# Patient Record
Sex: MALE | Race: OTHER | HISPANIC OR LATINO | URBAN - METROPOLITAN AREA
[De-identification: names, ages, dates, MRNs, and addresses within clinical notes are randomized per-mention and may not be internally consistent; named-entity substitution may affect disease eponyms.]

---

## 2017-08-01 ENCOUNTER — EMERGENCY (EMERGENCY)
Facility: HOSPITAL | Age: 67
LOS: 1 days | Discharge: HOME | End: 2017-08-01
Attending: EMERGENCY MEDICINE | Admitting: EMERGENCY MEDICINE
Payer: MEDICARE

## 2017-08-01 VITALS
RESPIRATION RATE: 19 BRPM | DIASTOLIC BLOOD PRESSURE: 92 MMHG | OXYGEN SATURATION: 100 % | SYSTOLIC BLOOD PRESSURE: 126 MMHG | TEMPERATURE: 100 F | HEIGHT: 63 IN | HEART RATE: 97 BPM | WEIGHT: 192.02 LBS

## 2017-08-01 VITALS
SYSTOLIC BLOOD PRESSURE: 161 MMHG | OXYGEN SATURATION: 96 % | RESPIRATION RATE: 16 BRPM | HEART RATE: 64 BPM | DIASTOLIC BLOOD PRESSURE: 96 MMHG
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
## 2017-08-01 DIAGNOSIS — S49.91XA UNSPECIFIED INJURY OF RIGHT SHOULDER AND UPPER ARM, INITIAL ENCOUNTER: ICD-10-CM

## 2017-08-01 DIAGNOSIS — I10 ESSENTIAL (PRIMARY) HYPERTENSION: ICD-10-CM

## 2017-08-01 DIAGNOSIS — S42.141A DISPLACED FRACTURE OF GLENOID CAVITY OF SCAPULA, RIGHT SHOULDER, INITIAL ENCOUNTER FOR CLOSED FRACTURE: ICD-10-CM
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
## 2017-08-01 DIAGNOSIS — E11.9 TYPE 2 DIABETES MELLITUS WITHOUT COMPLICATIONS: ICD-10-CM

## 2017-08-01 PROCEDURE — 99284 EMERGENCY DEPT VISIT MOD MDM: CPT | Mod: 25

## 2017-08-01 PROCEDURE — 99283 EMERGENCY DEPT VISIT LOW MDM: CPT | Mod: 25

## 2017-08-01 PROCEDURE — 23570 CLTX SCAPULAR FX W/O MNPJ: CPT | Mod: RT

## 2017-08-01 PROCEDURE — 73030 X-RAY EXAM OF SHOULDER: CPT

## 2017-08-01 PROCEDURE — 73030 X-RAY EXAM OF SHOULDER: CPT | Mod: 26,RT

## 2017-08-01 RX ORDER — OXYCODONE HYDROCHLORIDE 5 MG/1
1 TABLET ORAL
Qty: 16 | Refills: 0 | OUTPATIENT
Start: 2017-08-01 | End: 2017-08-05

## 2017-08-01 RX ORDER — OXYCODONE AND ACETAMINOPHEN 5; 325 MG/1; MG/1
1 TABLET ORAL ONCE
Qty: 0 | Refills: 0 | Status: DISCONTINUED | OUTPATIENT
Start: 2017-08-01 | End: 2017-08-01

## 2017-08-01 RX ADMIN — OXYCODONE AND ACETAMINOPHEN 1 TABLET(S): 5; 325 TABLET ORAL at 08:04

## 2017-08-01 NOTE — ED ADULT TRIAGE NOTE - CHIEF COMPLAINT QUOTE
pt  stated that he fell on his right side  while reaching for something at home and is now having r shoulder pain

## 2017-08-01 NOTE — ED PROVIDER NOTE - PRINCIPAL DIAGNOSIS
Shoulder injury, right, initial encounter Glenoid fracture of shoulder, right, closed, initial encounter

## 2017-08-01 NOTE — ED ADULT NURSE REASSESSMENT NOTE - NS ED NURSE REASSESS COMMENT FT1
pt in no acute distress. safety maintained. family at bedside. pending radiology results. will continue to monitor.

## 2017-08-01 NOTE — ED PROVIDER NOTE - MEDICAL DECISION MAKING DETAILS
Pt with injury to R shoulder after fall with limited ROM as noted above.  NVI intact, no other signs of injury.  DDx includes rotator cuff injury, dislocation.  Plan xray, pain control and reassess.

## 2017-08-01 NOTE — ED PROVIDER NOTE - OBJECTIVE STATEMENT
66 yo M diabetic with hx of HTN presenting with R shoulder pain s/p mechanical slip and fall from 2 foot height landing on outstretched R arm and hand causing immediate pain to R upper arm and shoulder.  DROM since injury which occurred yesterday at 3pm.  No other injury or trauma.  Denies head injury.

## 2017-08-01 NOTE — ED PROVIDER NOTE - DIAGNOSTIC INTERPRETATION
Shoulder xray:  Fracture of glenoid and likely coracoid process.  Nl humeral head.  No dislocation.  Nl soft tissue.

## 2017-08-01 NOTE — ED SUB INTERN NOTE - MEDICAL DECISION MAKING DETAILS
67 year old man with right shoulder pain and limited external rotation and abduction 2/2 fall from bed height with arm extended. DDx shoulder dislocation vs rotator cuff injury, humeral fracture less likely.   Pain control with oral percocet; A/P, oblique, axillary and y scapular views of right shoulder to evaluate for dislocation

## 2017-08-01 NOTE — ED ADULT NURSE NOTE - OBJECTIVE STATEMENT
right shoulder pain, from a fall yesterday, while reaching a cabinet right shoulder pain, from a fall yesterday, while reaching at a cabinet

## 2017-08-01 NOTE — ED PROVIDER NOTE - CARE PLAN
Principal Discharge DX:	Shoulder injury, right, initial encounter Principal Discharge DX:	Glenoid fracture of shoulder, right, closed, initial encounter

## 2017-08-01 NOTE — ED ADULT NURSE NOTE - CHPI ED SYMPTOMS NEG
no fever/no confusion/no tingling/no numbness/no weakness/no abrasion/no loss of consciousness/no deformity/no bleeding/no vomiting

## 2017-08-01 NOTE — ED SUB INTERN NOTE - OBJECTIVE STATEMENT FT
67 year old man, Yoruba speaking, HTN, DM2, presents with 1 day history of right shoulder pain. Was lifting object while in bed yesterday when he fell on the ground with right arm extended. Currently experiencing 10/10 pain worst over the anterior right humerus but including the entire right shoulder area. Also experiencing limited abduction and external rotation of shoulder.

## 2017-08-01 NOTE — ED PROVIDER NOTE - NEUROLOGICAL, MLM
Nl motor and sensory exam to MRU and axillary nerves.  Alert and oriented, no focal deficits, no motor or sensory deficits.

## 2017-08-01 NOTE — ED PROVIDER NOTE - MUSCULOSKELETAL, MLM
All bony prominences palpated and nontender except R proximal humerus and GH joint.  DROM R GH joint.  Abduction to 90 degrees only.  No obvious deformity.

## 2018-06-09 ENCOUNTER — INPATIENT (INPATIENT)
Facility: HOSPITAL | Age: 68
LOS: 3 days | Discharge: HOME | DRG: 69 | End: 2018-06-13
Attending: PSYCHIATRY & NEUROLOGY | Admitting: PSYCHIATRY & NEUROLOGY
Payer: MEDICARE

## 2018-06-09 VITALS
WEIGHT: 197.09 LBS | RESPIRATION RATE: 20 BRPM | SYSTOLIC BLOOD PRESSURE: 223 MMHG | TEMPERATURE: 98 F | OXYGEN SATURATION: 98 % | HEIGHT: 63 IN | HEART RATE: 60 BPM | DIASTOLIC BLOOD PRESSURE: 94 MMHG

## 2018-06-09 DIAGNOSIS — R63.8 OTHER SYMPTOMS AND SIGNS CONCERNING FOOD AND FLUID INTAKE: ICD-10-CM

## 2018-06-09 DIAGNOSIS — Z29.9 ENCOUNTER FOR PROPHYLACTIC MEASURES, UNSPECIFIED: ICD-10-CM

## 2018-06-09 DIAGNOSIS — I10 ESSENTIAL (PRIMARY) HYPERTENSION: ICD-10-CM

## 2018-06-09 DIAGNOSIS — E11.9 TYPE 2 DIABETES MELLITUS WITHOUT COMPLICATIONS: ICD-10-CM

## 2018-06-09 LAB
ALBUMIN SERPL ELPH-MCNC: 4.3 G/DL — SIGNIFICANT CHANGE UP (ref 3.3–5)
ALP SERPL-CCNC: 57 U/L — SIGNIFICANT CHANGE UP (ref 40–120)
ALT FLD-CCNC: 22 U/L — SIGNIFICANT CHANGE UP (ref 10–45)
ANION GAP SERPL CALC-SCNC: 11 MMOL/L — SIGNIFICANT CHANGE UP (ref 5–17)
APTT BLD: 30.5 SEC — SIGNIFICANT CHANGE UP (ref 27.5–37.4)
AST SERPL-CCNC: 23 U/L — SIGNIFICANT CHANGE UP (ref 10–40)
BASOPHILS NFR BLD AUTO: 0.4 % — SIGNIFICANT CHANGE UP (ref 0–2)
BILIRUB SERPL-MCNC: 0.9 MG/DL — SIGNIFICANT CHANGE UP (ref 0.2–1.2)
BUN SERPL-MCNC: 5 MG/DL — LOW (ref 7–23)
CALCIUM SERPL-MCNC: 9.3 MG/DL — SIGNIFICANT CHANGE UP (ref 8.4–10.5)
CHLORIDE SERPL-SCNC: 102 MMOL/L — SIGNIFICANT CHANGE UP (ref 96–108)
CHOLEST SERPL-MCNC: 157 MG/DL — SIGNIFICANT CHANGE UP (ref 10–199)
CO2 SERPL-SCNC: 26 MMOL/L — SIGNIFICANT CHANGE UP (ref 22–31)
CREAT SERPL-MCNC: 0.71 MG/DL — SIGNIFICANT CHANGE UP (ref 0.5–1.3)
EOSINOPHIL NFR BLD AUTO: 2.5 % — SIGNIFICANT CHANGE UP (ref 0–6)
GLUCOSE BLDC GLUCOMTR-MCNC: 117 MG/DL — HIGH (ref 70–99)
GLUCOSE BLDC GLUCOMTR-MCNC: 134 MG/DL — HIGH (ref 70–99)
GLUCOSE SERPL-MCNC: 132 MG/DL — HIGH (ref 70–99)
HCT VFR BLD CALC: 44 % — SIGNIFICANT CHANGE UP (ref 39–50)
HDLC SERPL-MCNC: 59 MG/DL — SIGNIFICANT CHANGE UP (ref 40–125)
HGB BLD-MCNC: 15.8 G/DL — SIGNIFICANT CHANGE UP (ref 13–17)
INR BLD: 1.01 — SIGNIFICANT CHANGE UP (ref 0.88–1.16)
LIPID PNL WITH DIRECT LDL SERPL: 82 MG/DL — SIGNIFICANT CHANGE UP
LYMPHOCYTES # BLD AUTO: 32.8 % — SIGNIFICANT CHANGE UP (ref 13–44)
MCHC RBC-ENTMCNC: 31.5 PG — SIGNIFICANT CHANGE UP (ref 27–34)
MCHC RBC-ENTMCNC: 35.9 G/DL — SIGNIFICANT CHANGE UP (ref 32–36)
MCV RBC AUTO: 87.6 FL — SIGNIFICANT CHANGE UP (ref 80–100)
MONOCYTES NFR BLD AUTO: 7.3 % — SIGNIFICANT CHANGE UP (ref 2–14)
NEUTROPHILS NFR BLD AUTO: 57 % — SIGNIFICANT CHANGE UP (ref 43–77)
PLATELET # BLD AUTO: 248 K/UL — SIGNIFICANT CHANGE UP (ref 150–400)
POTASSIUM SERPL-MCNC: 4 MMOL/L — SIGNIFICANT CHANGE UP (ref 3.5–5.3)
POTASSIUM SERPL-SCNC: 4 MMOL/L — SIGNIFICANT CHANGE UP (ref 3.5–5.3)
PROT SERPL-MCNC: 8.2 G/DL — SIGNIFICANT CHANGE UP (ref 6–8.3)
PROTHROM AB SERPL-ACNC: 11.2 SEC — SIGNIFICANT CHANGE UP (ref 9.8–12.7)
RBC # BLD: 5.02 M/UL — SIGNIFICANT CHANGE UP (ref 4.2–5.8)
RBC # FLD: 13 % — SIGNIFICANT CHANGE UP (ref 10.3–16.9)
SODIUM SERPL-SCNC: 139 MMOL/L — SIGNIFICANT CHANGE UP (ref 135–145)
TOTAL CHOLESTEROL/HDL RATIO MEASUREMENT: 2.7 RATIO — LOW (ref 3.4–9.6)
TRIGL SERPL-MCNC: 78 MG/DL — SIGNIFICANT CHANGE UP (ref 10–149)
WBC # BLD: 7.7 K/UL — SIGNIFICANT CHANGE UP (ref 3.8–10.5)
WBC # FLD AUTO: 7.7 K/UL — SIGNIFICANT CHANGE UP (ref 3.8–10.5)

## 2018-06-09 PROCEDURE — 71045 X-RAY EXAM CHEST 1 VIEW: CPT | Mod: 26

## 2018-06-09 PROCEDURE — 70450 CT HEAD/BRAIN W/O DYE: CPT | Mod: 26,59

## 2018-06-09 PROCEDURE — 70498 CT ANGIOGRAPHY NECK: CPT | Mod: 26

## 2018-06-09 PROCEDURE — 0042T: CPT

## 2018-06-09 PROCEDURE — 99291 CRITICAL CARE FIRST HOUR: CPT | Mod: GC

## 2018-06-09 PROCEDURE — 70496 CT ANGIOGRAPHY HEAD: CPT | Mod: 26

## 2018-06-09 PROCEDURE — 93010 ELECTROCARDIOGRAM REPORT: CPT | Mod: GC

## 2018-06-09 RX ORDER — CLOPIDOGREL BISULFATE 75 MG/1
600 TABLET, FILM COATED ORAL ONCE
Qty: 0 | Refills: 0 | Status: COMPLETED | OUTPATIENT
Start: 2018-06-09 | End: 2018-06-09

## 2018-06-09 RX ORDER — INSULIN LISPRO 100/ML
VIAL (ML) SUBCUTANEOUS
Qty: 0 | Refills: 0 | Status: DISCONTINUED | OUTPATIENT
Start: 2018-06-09 | End: 2018-06-13

## 2018-06-09 RX ORDER — CLOPIDOGREL BISULFATE 75 MG/1
75 TABLET, FILM COATED ORAL DAILY
Qty: 0 | Refills: 0 | Status: DISCONTINUED | OUTPATIENT
Start: 2018-06-10 | End: 2018-06-13

## 2018-06-09 RX ORDER — ASPIRIN/CALCIUM CARB/MAGNESIUM 324 MG
81 TABLET ORAL DAILY
Qty: 0 | Refills: 0 | Status: DISCONTINUED | OUTPATIENT
Start: 2018-06-10 | End: 2018-06-13

## 2018-06-09 RX ORDER — DEXTROSE 50 % IN WATER 50 %
12.5 SYRINGE (ML) INTRAVENOUS ONCE
Qty: 0 | Refills: 0 | Status: DISCONTINUED | OUTPATIENT
Start: 2018-06-09 | End: 2018-06-13

## 2018-06-09 RX ORDER — DEXTROSE 50 % IN WATER 50 %
15 SYRINGE (ML) INTRAVENOUS ONCE
Qty: 0 | Refills: 0 | Status: DISCONTINUED | OUTPATIENT
Start: 2018-06-09 | End: 2018-06-13

## 2018-06-09 RX ORDER — GLUCAGON INJECTION, SOLUTION 0.5 MG/.1ML
1 INJECTION, SOLUTION SUBCUTANEOUS ONCE
Qty: 0 | Refills: 0 | Status: DISCONTINUED | OUTPATIENT
Start: 2018-06-09 | End: 2018-06-13

## 2018-06-09 RX ORDER — METOPROLOL TARTRATE 50 MG
1 TABLET ORAL
Qty: 0 | Refills: 0 | COMMUNITY

## 2018-06-09 RX ORDER — DEXTROSE 50 % IN WATER 50 %
25 SYRINGE (ML) INTRAVENOUS ONCE
Qty: 0 | Refills: 0 | Status: DISCONTINUED | OUTPATIENT
Start: 2018-06-09 | End: 2018-06-13

## 2018-06-09 RX ORDER — SODIUM CHLORIDE 9 MG/ML
1000 INJECTION, SOLUTION INTRAVENOUS
Qty: 0 | Refills: 0 | Status: DISCONTINUED | OUTPATIENT
Start: 2018-06-09 | End: 2018-06-13

## 2018-06-09 RX ORDER — ATORVASTATIN CALCIUM 80 MG/1
80 TABLET, FILM COATED ORAL AT BEDTIME
Qty: 0 | Refills: 0 | Status: DISCONTINUED | OUTPATIENT
Start: 2018-06-09 | End: 2018-06-13

## 2018-06-09 RX ORDER — ASPIRIN/CALCIUM CARB/MAGNESIUM 324 MG
325 TABLET ORAL ONCE
Qty: 0 | Refills: 0 | Status: COMPLETED | OUTPATIENT
Start: 2018-06-09 | End: 2018-06-09

## 2018-06-09 RX ORDER — HEPARIN SODIUM 5000 [USP'U]/ML
5000 INJECTION INTRAVENOUS; SUBCUTANEOUS EVERY 8 HOURS
Qty: 0 | Refills: 0 | Status: DISCONTINUED | OUTPATIENT
Start: 2018-06-09 | End: 2018-06-13

## 2018-06-09 RX ORDER — LOSARTAN POTASSIUM 100 MG/1
25 TABLET, FILM COATED ORAL DAILY
Qty: 0 | Refills: 0 | Status: DISCONTINUED | OUTPATIENT
Start: 2018-06-09 | End: 2018-06-11

## 2018-06-09 RX ORDER — SITAGLIPTIN AND METFORMIN HYDROCHLORIDE 500; 50 MG/1; MG/1
1 TABLET, FILM COATED ORAL
Qty: 0 | Refills: 0 | COMMUNITY

## 2018-06-09 RX ORDER — OLMESARTAN MEDOXOMIL 5 MG/1
1 TABLET, FILM COATED ORAL
Qty: 0 | Refills: 0 | COMMUNITY

## 2018-06-09 RX ADMIN — ATORVASTATIN CALCIUM 80 MILLIGRAM(S): 80 TABLET, FILM COATED ORAL at 21:30

## 2018-06-09 RX ADMIN — LOSARTAN POTASSIUM 25 MILLIGRAM(S): 100 TABLET, FILM COATED ORAL at 21:30

## 2018-06-09 RX ADMIN — CLOPIDOGREL BISULFATE 600 MILLIGRAM(S): 75 TABLET, FILM COATED ORAL at 12:56

## 2018-06-09 RX ADMIN — Medication 325 MILLIGRAM(S): at 12:56

## 2018-06-09 RX ADMIN — HEPARIN SODIUM 5000 UNIT(S): 5000 INJECTION INTRAVENOUS; SUBCUTANEOUS at 23:52

## 2018-06-09 RX ADMIN — HEPARIN SODIUM 5000 UNIT(S): 5000 INJECTION INTRAVENOUS; SUBCUTANEOUS at 17:34

## 2018-06-09 NOTE — ED ADULT NURSE NOTE - OBJECTIVE STATEMENT
69 y/o male w/ hx of HTN and DM presents to the ED c/o dizziness associated w/ blurry vision and double vision upon awakening at 6 AM. Pt's last known normal was 9 PM last night. Denies numbness, tingling, weakness, chest pain, SOB, abdominal pain, nausea, vomiting, and any other associated symptoms.

## 2018-06-09 NOTE — ED PROVIDER NOTE - ATTENDING CONTRIBUTION TO CARE
67 yo male hx dm, htn presents with 6AM onset of blurry vision/dizziness, with subjective gait instability and BP on triage >200 systolic. Patient never with tia/stroke before, has been compliant with his olmesartan and toprol bp medications. Denies nausea, cp, abdominal pain, shortness of breath, palpitations. He reports that he took his medications for BP this AM. He is accompanied by his wife who corroborated story and gave additional history.  PT went to bed at 9pm night prior and woke at 6am with symptoms.      PE - (+) vertigo when lying and standing, ? unsteady gait, no slurred speech, no facial droop, strength 5/5 b/l upper and lower ext, double vision with left eye only    CODE STROKE called upon arrival - ct head, ct perf, cta head/neck done in ED, severe white matter disease with hypodensities in occipital lobe which could correlate with symptoms    Pt remained hypertensive in ED - as per Dr. Mccauley -  and below.  Pt admitted to [facility] for further eval and mgmt.

## 2018-06-09 NOTE — ED PROVIDER NOTE - OBJECTIVE STATEMENT
69 yo male hx dm, htn presents with 6AM onset of blurry vision/dizziness, with subjective gait instability and BP on triage >200 systolic. Patient never with tia/stroke before, has been compliant with his olmesartan and toprol bp medications. Denies nausea, cp, abdominal pain, shortness of breath, palpitations. He reports that he took his medications for BP this AM. He is accompanied by his wife who corroborated story and gave additional history.

## 2018-06-09 NOTE — H&P ADULT - HISTORY OF PRESENT ILLNESS
67 yo M with HTN and DM presents with d    67 yo male hx dm, htn presents with 6AM onset of blurry vision/dizziness, with subjective gait instability and BP on triage >200 systolic. Patient never with tia/stroke before, has been compliant with his olmesartan and toprol bp medications. Denies nausea, cp, abdominal pain, shortness of breath, palpitations. He reports that he took his medications for BP this AM. He is accompanied by his wife who corroborated story and gave additional history. 67 yo M with HTN and DM presents with dizziness and double vision starting 6AM this morning. Last known normal was last night around 9pm. Pt denies neausea, vomiting, weakness, facial droop, slurred speech, word finding difficulties, paresthesias, chest pain, palpitations. Pt reports compliance with his medications. Denies prior CVA/TIA. NIHSS 0. CT head negative extensive areas extensive changes of chronic ischemic microangiopathy, early occipital infarcts are not excluded. CTA head/neck without significant stenosis, CTP without perfusion abnormality. Pt is admitted to [facility] for further CVA workup

## 2018-06-09 NOTE — H&P ADULT - NSHPPHYSICALEXAM_GEN_ALL_CORE
.  VITAL SIGNS:  T(C): 37.1 (06-09-18 @ 17:29), Max: 37.1 (06-09-18 @ 17:29)  T(F): 98.8 (06-09-18 @ 17:29), Max: 98.8 (06-09-18 @ 17:29)  HR: 46 (06-09-18 @ 16:30) (46 - 61)  BP: 198/84 (06-09-18 @ 16:30) (183/80 - 233/117)  BP(mean): 121 (06-09-18 @ 16:30) (115 - 121)  RR: 20 (06-09-18 @ 16:30) (18 - 20)  SpO2: 96% (06-09-18 @ 16:30) (93% - 98%)  Wt(kg): --    PHYSICAL EXAM:    Constitutional: WDWN resting comfortably in bed; NAD  Head: NC/AT  Eyes: PERRLA, EOMI, clear conjunctiva  ENT: no nasal discharge; no oropharyngeal erythema or exudates; dry oral mucosa  Neck: supple; no JVD or thyromegaly  Respiratory: CTA B/L; no W/R/R, no retractions  Cardiac: +S1/S2; RRR; no M/R/G; PMI non-displaced  Gastrointestinal: soft, NT/ND; no rebound or guarding; +BS, no hepatosplenomegaly  Extremities: WWP, no clubbing or cyanosis; no peripheral edema  Vascular: 2+ radial, DP/PT pulses B/L  Lymphatic: no submandibular or cervical LAD  Neurologic:  Mental status: Awake, alert and oriented x3.  No dysarthria, no aphasia.    Cranial nerves: PERRLA. Visual fields are full to confrontation. EOMI, no gaze palsy. + Left horizontal nystagmus.   Facial sensation intact to light touch and pinprick in all 3 divisions. No falttening of nasolabial folds.  Hearing intact to finger rub, palate elevation symmetric, sternocleidomastoid/shoulder shrug strength bilaterally 5/5, tongue was midline,  Motor:  Normal bulk and tone. Strength of RLE 5/5,  RUE 5/5, LUE 5/5, LLE 5/5. Sensation intact to light touch and pinprick b/l  Coordination: No dysmetria on finger-to-nose and heel-to-shin.  No clumsiness.  Reflexes: 2+ in upper and lower extremities. Plantar reflex downgoing b/l.   Gait: normal gait

## 2018-06-09 NOTE — H&P ADULT - ATTENDING COMMENTS
Patient seen and examined.  Agree with above.  Patient complains of double vision yesterday, both [id number] and top-bottom but it's resolved.  No specific weakness or numbness.  No speech deficits.    No focality on neurological exam today.  r/o CVA     Plan:  1) Secondary stroke prevention - loaded with Plavix and Aspirin 325mg yesterday -   - Continue Aspirin 81mg and Plavix 75mg for dual antiplatelet   - Continue Atorvastatin 80mg for statin.    2) Stroke workup -   - MRI Brain without mic  - THOMAS  - PT eval    3) Stroke risk factors -   a) HTN - Permissive HTN  b) DM - needs better control    4) DVT prophylaxis - heparin SQ TID

## 2018-06-09 NOTE — PATIENT PROFILE ADULT. - PRO MENTAL HEALTH SX RECENT
feeling depressed/r/t recent events in [facility]; family & friends are there/feeling down/sad

## 2018-06-09 NOTE — ED ADULT TRIAGE NOTE - CHIEF COMPLAINT QUOTE
Patient c/o dizziness ( feels unsteady with blurry vision and double vision since 6 am this morning . History of DM . No facial droop ,arm drift nor slurred speech noted . FSBS 115 mg/dl in triage .

## 2018-06-09 NOTE — H&P ADULT - PROBLEM SELECTOR PLAN 1
NIHSS 0. Exam notable for horizontal nystagmus of left eye. No weakness or paresthesia, gait baseline. CT head negative extensive areas extensive changes of chronic ischemic microangiopathy, early occipital infarcts are not excluded. CTA head/neck without significant stenosis, CTP without perfusion abnormality.  - NIHSS 0. Exam notable for horizontal nystagmus of left eye. No weakness or paresthesia, gait baseline. CT head negative extensive areas extensive changes of chronic ischemic microangiopathy, early occipital infarcts are not excluded. CTA head/neck without significant stenosis, CTP without perfusion abnormality. Loaded with aspirin and plavix in ED. Passed bedside dysphagia.  - aspirin 81mg daily, plavix 75mg daily, atorvastatin 80mg qHS  - MRI head non con  - THOMAS w/ doppler  - Permissive HTN to SBP<220/110  - PT/OT  - Aspiration precautions

## 2018-06-09 NOTE — H&P ADULT - PROBLEM SELECTOR PLAN 3
on metoprolol succinate 50mg ER qd and olmesartan 20mg qd  - Permissive HTN to <220/110  - hold home anti-HTN meds on metoprolol succinate 50mg ER qd and olmesartan 20mg qd  - Permissive HTN to <210/110  - hold home anti-HTN meds

## 2018-06-09 NOTE — H&P ADULT - ASSESSMENT
69 yo M with HTN and DM presents with dizziness and double vision starting 6AM this morning. NIHSS 0. Exam notable for horizontal nystagmus of left eye.

## 2018-06-09 NOTE — ED PROVIDER NOTE - CRITICAL CARE PROVIDED
interpretation of diagnostic studies/consultation with other physicians/additional history taking/telephone consultation with the patient's family/conducted a detailed discussion of DNR status/documentation/direct patient care (not related to procedure)/consult w/ pt's family directly relating to pts condition

## 2018-06-09 NOTE — H&P ADULT - NSHPLABSRESULTS_GEN_ALL_CORE
.  LABS:                         15.8   7.7   )-----------( 248      ( 09 Jun 2018 11:30 )             44.0     06-09    139  |  102  |  5<L>  ----------------------------<  132<H>  4.0   |  26  |  0.71    Ca    9.3      09 Jun 2018 11:30    TPro  8.2  /  Alb  4.3  /  TBili  0.9  /  DBili  x   /  AST  23  /  ALT  22  /  AlkPhos  57  06-09    PT/INR - ( 09 Jun 2018 11:30 )   PT: 11.2 sec;   INR: 1.01          PTT - ( 09 Jun 2018 11:30 )  PTT:30.5 sec      RADIOLOGY, EKG & ADDITIONAL TESTS: Reviewed.   < from: CT Brain Stroke Protocol (06.09.18 @ 12:03) >    The ventricles, sulci and cisterns are normal in caliber for the   patient's age. There are extensive areas of hypodensity inthe cerebral   white matter compatible with severe changes of chronic ischemic   microangiopathy. Remote appearing lacunar infarcts are identified in the   bilateral basal ganglia and thalami. There are areas of hypodensity in   the bilateral medial occipital lobes that appear to reflect volume   averaging from prominent sulci, though early infarction is not excluded.   There is no evidence of posterior fossa infarction. There is no acute   intracranial hemorrhage, midline shift, mass effect or extracerebral   collection.    There is bilateral proptosis without orbital mass. Please clinically   correlate with regard to potential thyroid ophthalmopathy. The paranasal   sinuses and mastoids are clear. There is no lesion of the skull or skull   base.    IMPRESSION:    Extensive changes of chronic ischemic microangiopathy, as above. Early   occipital infarcts are not excluded.    < end of copied text >    < from: CT Angio Head w/ IV Cont (06.09.18 @ 12:01) >    IMPRESSION:  Normal intracranial arterial circulation.    < end of copied text >    < from: CT Angio Neck w/ IV Cont (06.09.18 @ 12:01) >    IMPRESSION:  No significant cervical arterial stenosis.    < end of copied text >    < from: CT Brain Stroke Protocol (06.09.18 @ 12:03) >    IMPRESSION:    Extensive changes of chronic ischemic microangiopathy, as above. Early   occipital infarcts are not excluded.    < end of copied text >

## 2018-06-09 NOTE — ED PROVIDER NOTE - MEDICAL DECISION MAKING DETAILS
patient with HTN, compliant with emds, lats known normal 9PM prior evening. Stroke code called, Dr. Mccauley attending. CT head prelim shows hypodensities in occipital lobe, no bleed.

## 2018-06-09 NOTE — H&P ADULT - NSHPREVIEWOFSYSTEMS_GEN_ALL_CORE
REVIEW OF SYSTEMS:    CONSTITUTIONAL: No weakness, fevers or chills  EYES/ENT: Double vision  NECK: No pain or stiffness  RESPIRATORY: No cough, wheezing, hemoptysis; No shortness of breath  CARDIOVASCULAR: No chest pain or palpitations  GASTROINTESTINAL: No abdominal or epigastric pain. No nausea, vomiting, or hematemesis; No diarrhea or constipation. No melena or hematochezia.  GENITOURINARY: No dysuria, frequency or hematuria  NEUROLOGICAL: See HPI  SKIN: No itching, burning, rashes, or lesions   MSK: no joint pain, no joint swelling  All other review of systems is negative unless indicated above.

## 2018-06-09 NOTE — PATIENT PROFILE ADULT. - VISION (WITH CORRECTIVE LENSES IF THE PATIENT USUALLY WEARS THEM):
blurry vision currently, glasses for reading/Partially impaired: cannot see medication labels or newsprint, but can see obstacles in path, and the surrounding layout; can count fingers at arm's length

## 2018-06-10 LAB
ANION GAP SERPL CALC-SCNC: 11 MMOL/L — SIGNIFICANT CHANGE UP (ref 5–17)
BUN SERPL-MCNC: 7 MG/DL — SIGNIFICANT CHANGE UP (ref 7–23)
CALCIUM SERPL-MCNC: 9.4 MG/DL — SIGNIFICANT CHANGE UP (ref 8.4–10.5)
CHLORIDE SERPL-SCNC: 102 MMOL/L — SIGNIFICANT CHANGE UP (ref 96–108)
CO2 SERPL-SCNC: 26 MMOL/L — SIGNIFICANT CHANGE UP (ref 22–31)
CREAT SERPL-MCNC: 0.72 MG/DL — SIGNIFICANT CHANGE UP (ref 0.5–1.3)
GLUCOSE BLDC GLUCOMTR-MCNC: 141 MG/DL — HIGH (ref 70–99)
GLUCOSE BLDC GLUCOMTR-MCNC: 150 MG/DL — HIGH (ref 70–99)
GLUCOSE BLDC GLUCOMTR-MCNC: 165 MG/DL — HIGH (ref 70–99)
GLUCOSE BLDC GLUCOMTR-MCNC: 196 MG/DL — HIGH (ref 70–99)
GLUCOSE BLDC GLUCOMTR-MCNC: 215 MG/DL — HIGH (ref 70–99)
GLUCOSE SERPL-MCNC: 190 MG/DL — HIGH (ref 70–99)
HBA1C BLD-MCNC: 8.4 % — HIGH (ref 4–5.6)
HCT VFR BLD CALC: 43.3 % — SIGNIFICANT CHANGE UP (ref 39–50)
HCV AB S/CO SERPL IA: 0.18 S/CO — SIGNIFICANT CHANGE UP
HCV AB SERPL-IMP: SIGNIFICANT CHANGE UP
HGB BLD-MCNC: 15 G/DL — SIGNIFICANT CHANGE UP (ref 13–17)
MAGNESIUM SERPL-MCNC: 1.9 MG/DL — SIGNIFICANT CHANGE UP (ref 1.6–2.6)
MCHC RBC-ENTMCNC: 31.1 PG — SIGNIFICANT CHANGE UP (ref 27–34)
MCHC RBC-ENTMCNC: 34.6 G/DL — SIGNIFICANT CHANGE UP (ref 32–36)
MCV RBC AUTO: 89.6 FL — SIGNIFICANT CHANGE UP (ref 80–100)
PLATELET # BLD AUTO: 224 K/UL — SIGNIFICANT CHANGE UP (ref 150–400)
POTASSIUM SERPL-MCNC: 4 MMOL/L — SIGNIFICANT CHANGE UP (ref 3.5–5.3)
POTASSIUM SERPL-SCNC: 4 MMOL/L — SIGNIFICANT CHANGE UP (ref 3.5–5.3)
RBC # BLD: 4.83 M/UL — SIGNIFICANT CHANGE UP (ref 4.2–5.8)
RBC # FLD: 13.1 % — SIGNIFICANT CHANGE UP (ref 10.3–16.9)
SODIUM SERPL-SCNC: 139 MMOL/L — SIGNIFICANT CHANGE UP (ref 135–145)
TSH SERPL-MCNC: 1.05 UIU/ML — SIGNIFICANT CHANGE UP (ref 0.35–4.94)
WBC # BLD: 6.9 K/UL — SIGNIFICANT CHANGE UP (ref 3.8–10.5)
WBC # FLD AUTO: 6.9 K/UL — SIGNIFICANT CHANGE UP (ref 3.8–10.5)

## 2018-06-10 PROCEDURE — 99223 1ST HOSP IP/OBS HIGH 75: CPT

## 2018-06-10 RX ORDER — ACETAMINOPHEN 500 MG
650 TABLET ORAL ONCE
Qty: 0 | Refills: 0 | Status: COMPLETED | OUTPATIENT
Start: 2018-06-10 | End: 2018-06-10

## 2018-06-10 RX ORDER — LABETALOL HCL 100 MG
5 TABLET ORAL ONCE
Qty: 0 | Refills: 0 | Status: COMPLETED | OUTPATIENT
Start: 2018-06-10 | End: 2018-06-10

## 2018-06-10 RX ORDER — HYDRALAZINE HCL 50 MG
5 TABLET ORAL ONCE
Qty: 0 | Refills: 0 | Status: COMPLETED | OUTPATIENT
Start: 2018-06-10 | End: 2018-06-10

## 2018-06-10 RX ADMIN — Medication 650 MILLIGRAM(S): at 23:30

## 2018-06-10 RX ADMIN — Medication 2: at 11:55

## 2018-06-10 RX ADMIN — HEPARIN SODIUM 5000 UNIT(S): 5000 INJECTION INTRAVENOUS; SUBCUTANEOUS at 07:30

## 2018-06-10 RX ADMIN — HEPARIN SODIUM 5000 UNIT(S): 5000 INJECTION INTRAVENOUS; SUBCUTANEOUS at 23:25

## 2018-06-10 RX ADMIN — LOSARTAN POTASSIUM 25 MILLIGRAM(S): 100 TABLET, FILM COATED ORAL at 06:46

## 2018-06-10 RX ADMIN — Medication 81 MILLIGRAM(S): at 11:56

## 2018-06-10 RX ADMIN — CLOPIDOGREL BISULFATE 75 MILLIGRAM(S): 75 TABLET, FILM COATED ORAL at 11:56

## 2018-06-10 RX ADMIN — ATORVASTATIN CALCIUM 80 MILLIGRAM(S): 80 TABLET, FILM COATED ORAL at 22:23

## 2018-06-10 RX ADMIN — Medication 2: at 06:45

## 2018-06-10 RX ADMIN — HEPARIN SODIUM 5000 UNIT(S): 5000 INJECTION INTRAVENOUS; SUBCUTANEOUS at 18:45

## 2018-06-10 RX ADMIN — Medication 650 MILLIGRAM(S): at 22:30

## 2018-06-10 RX ADMIN — Medication 5 MILLIGRAM(S): at 19:38

## 2018-06-10 NOTE — PROGRESS NOTE ADULT - PROBLEM SELECTOR PLAN 3
on metoprolol succinate 50mg ER qd and olmesartan 20mg qd  - Permissive HTN to <210/110  - hold home anti-HTN meds

## 2018-06-10 NOTE — PROGRESS NOTE ADULT - PROBLEM SELECTOR PLAN 1
NIHSS 0. Exam notable for horizontal nystagmus of left eye. No weakness or paresthesia, gait baseline. CT head negative extensive areas extensive changes of chronic ischemic microangiopathy, early occipital infarcts are not excluded. CTA head/neck without significant stenosis, CTP without perfusion abnormality. Loaded with aspirin and plavix in ED. Passed bedside dysphagia.  - aspirin 81mg daily, plavix 75mg daily, atorvastatin 80mg qHS  - MRI head non con  - THOMAS w/ doppler  - Permissive HTN to SBP<220/110  - PT/OT  - Aspiration precautions

## 2018-06-10 NOTE — PROGRESS NOTE ADULT - SUBJECTIVE AND OBJECTIVE BOX
INTERVAL HPI/OVERNIGHT EVENTS: JUAN    SUBJECTIVE: Patient seen and examined at bedside. No longer reporting diplopia, no nystagmus in AM.    OBJECTIVE:    VITAL SIGNS:  ICU Vital Signs Last 24 Hrs  T(C): 36.8 (10 [facility] 2018 09:34), Max: 37.1 (09 Jun 2018 17:29)  T(F): 98.3 (10 [facility]att 2018 09:34), Max: 98.8 (09 Jun 2018 17:29)  HR: 60 (10 [facility]att 2018 08:52) (46 - 61)  BP: 181/83 (10 [facility]att 2018 08:52) (143/67 - 233/117)  BP(mean): 119 (10 [facility]att 2018 08:52) (96 - 135)  ABP: --  ABP(mean): --  RR: 20 (10 [facility]att 2018 08:52) (18 - 20)  SpO2: 94% (10 Wyatt 2018 08:52) (93% - 98%)        06-09 @ 07:01  -  06-10 @ 07:00  --------------------------------------------------------  IN: 0 mL / OUT: 400 mL / NET: -400 mL      CAPILLARY BLOOD GLUCOSE      POCT Blood Glucose.: 165 mg/dL (10 [facility]att 2018 06:36)      PHYSICAL EXAM:    General: WDWN resting comfortably in bed; NAD  HEENT: NC/AT PERRLA, EOMI, clear conjunctiva  Neck: supple; no JVD or thyromegaly  Respiratory: CTA B/L; no W/R/R, no retractions  Cardiac: +S1/S2; RRR; no M/R/G; PMI non-displaced  Gastrointestinal: soft, NT/ND; no rebound or guarding; +BS, no hepatosplenomegaly  Extremities: WWP, no clubbing or cyanosis; no peripheral edema  Vascular: 2+ radial, DP/PT pulses B/L  Lymphatic: no submandibular or cervical LAD  Neurologic:  Mental status: Awake, alert and oriented x3.  No dysarthria, no aphasia.    Cranial nerves: PERRLA. Visual fields are full to confrontation. EOMI, no gaze palsy. + Left horizontal nystagmus.   Facial sensation intact to light touch and pinprick in all 3 divisions. No falttening of nasolabial folds.  Hearing intact to finger rub, palate elevation symmetric, sternocleidomastoid/shoulder shrug strength bilaterally 5/5, tongue was midline,  Motor:  Normal bulk and tone. Strength of RLE 5/5,  RUE 5/5, LUE 5/5, LLE 5/5. Sensation intact to light touch and pinprick b/l  Coordination: No dysmetria on finger-to-nose and heel-to-shin.  No clumsiness.  Reflexes: 2+ in upper and lower extremities. Plantar reflex downgoing b/l.   Gait: normal gait      MEDICATIONS:  MEDICATIONS  (STANDING):  aspirin enteric coated 81 milliGRAM(s) Oral daily  atorvastatin 80 milliGRAM(s) Oral at bedtime  clopidogrel Tablet 75 milliGRAM(s) Oral daily  dextrose 5%. 1000 milliLiter(s) (50 mL/Hr) IV Continuous <Continuous>  dextrose 50% Injectable 12.5 Gram(s) IV Push once  dextrose 50% Injectable 25 Gram(s) IV Push once  dextrose 50% Injectable 25 Gram(s) IV Push once  heparin  Injectable 5000 Unit(s) SubCutaneous every 8 hours  insulin lispro (HumaLOG) corrective regimen sliding scale   SubCutaneous Before meals and at bedtime  losartan 25 milliGRAM(s) Oral daily    MEDICATIONS  (PRN):  dextrose 40% Gel 15 Gram(s) Oral once PRN Blood Glucose LESS THAN 70 milliGRAM(s)/deciliter  glucagon  Injectable 1 milliGRAM(s) IntraMuscular once PRN Glucose LESS THAN 70 milligrams/deciliter      ALLERGIES:  Allergies    No Known Allergies    Intolerances        LABS:                        15.0   6.9   )-----------( 224      ( 10 [facility] 2018 06:27 )             43.3     06-10    139  |  102  |  7   ----------------------------<  190<H>  4.0   |  26  |  0.72    Ca    9.4      10 [facility] 2018 06:26  Mg     1.9     06-10    TPro  8.2  /  Alb  4.3  /  TBili  0.9  /  DBili  x   /  AST  23  /  ALT  22  /  AlkPhos  57  06-09    PT/INR - ( 09 Jun 2018 11:30 )   PT: 11.2 sec;   INR: 1.01          PTT - ( 09 Jun 2018 11:30 )  PTT:30.5 sec      RADIOLOGY & ADDITIONAL TESTS: Reviewed.

## 2018-06-10 NOTE — PROGRESS NOTE ADULT - ASSESSMENT
67 yo M with HTN and DM presents with dizziness and double vision starting 6AM this morning. NIHSS 0. Exam notable for horizontal nystagmus of left eye. Improved.

## 2018-06-11 LAB
ANION GAP SERPL CALC-SCNC: 12 MMOL/L — SIGNIFICANT CHANGE UP (ref 5–17)
BUN SERPL-MCNC: 7 MG/DL — SIGNIFICANT CHANGE UP (ref 7–23)
CALCIUM SERPL-MCNC: 9.2 MG/DL — SIGNIFICANT CHANGE UP (ref 8.4–10.5)
CHLORIDE SERPL-SCNC: 100 MMOL/L — SIGNIFICANT CHANGE UP (ref 96–108)
CO2 SERPL-SCNC: 26 MMOL/L — SIGNIFICANT CHANGE UP (ref 22–31)
CREAT SERPL-MCNC: 0.64 MG/DL — SIGNIFICANT CHANGE UP (ref 0.5–1.3)
GLUCOSE BLDC GLUCOMTR-MCNC: 105 MG/DL — HIGH (ref 70–99)
GLUCOSE BLDC GLUCOMTR-MCNC: 128 MG/DL — HIGH (ref 70–99)
GLUCOSE BLDC GLUCOMTR-MCNC: 131 MG/DL — HIGH (ref 70–99)
GLUCOSE BLDC GLUCOMTR-MCNC: 156 MG/DL — HIGH (ref 70–99)
GLUCOSE BLDC GLUCOMTR-MCNC: 171 MG/DL — HIGH (ref 70–99)
GLUCOSE SERPL-MCNC: 183 MG/DL — HIGH (ref 70–99)
HCT VFR BLD CALC: 43.2 % — SIGNIFICANT CHANGE UP (ref 39–50)
HGB BLD-MCNC: 15.2 G/DL — SIGNIFICANT CHANGE UP (ref 13–17)
MAGNESIUM SERPL-MCNC: 1.9 MG/DL — SIGNIFICANT CHANGE UP (ref 1.6–2.6)
MCHC RBC-ENTMCNC: 31 PG — SIGNIFICANT CHANGE UP (ref 27–34)
MCHC RBC-ENTMCNC: 35.2 G/DL — SIGNIFICANT CHANGE UP (ref 32–36)
MCV RBC AUTO: 88.2 FL — SIGNIFICANT CHANGE UP (ref 80–100)
PHOSPHATE SERPL-MCNC: 3.2 MG/DL — SIGNIFICANT CHANGE UP (ref 2.5–4.5)
PLATELET # BLD AUTO: 221 K/UL — SIGNIFICANT CHANGE UP (ref 150–400)
POTASSIUM SERPL-MCNC: 3.6 MMOL/L — SIGNIFICANT CHANGE UP (ref 3.5–5.3)
POTASSIUM SERPL-SCNC: 3.6 MMOL/L — SIGNIFICANT CHANGE UP (ref 3.5–5.3)
RBC # BLD: 4.9 M/UL — SIGNIFICANT CHANGE UP (ref 4.2–5.8)
RBC # FLD: 12.6 % — SIGNIFICANT CHANGE UP (ref 10.3–16.9)
SODIUM SERPL-SCNC: 138 MMOL/L — SIGNIFICANT CHANGE UP (ref 135–145)
WBC # BLD: 6.5 K/UL — SIGNIFICANT CHANGE UP (ref 3.8–10.5)
WBC # FLD AUTO: 6.5 K/UL — SIGNIFICANT CHANGE UP (ref 3.8–10.5)

## 2018-06-11 PROCEDURE — 99233 SBSQ HOSP IP/OBS HIGH 50: CPT

## 2018-06-11 PROCEDURE — 93320 DOPPLER ECHO COMPLETE: CPT | Mod: 26

## 2018-06-11 PROCEDURE — 93970 EXTREMITY STUDY: CPT | Mod: 26

## 2018-06-11 PROCEDURE — 93312 ECHO TRANSESOPHAGEAL: CPT | Mod: 26

## 2018-06-11 PROCEDURE — 93325 DOPPLER ECHO COLOR FLOW MAPG: CPT | Mod: 26

## 2018-06-11 PROCEDURE — 70551 MRI BRAIN STEM W/O DYE: CPT | Mod: 26

## 2018-06-11 RX ORDER — POTASSIUM CHLORIDE 20 MEQ
10 PACKET (EA) ORAL
Qty: 0 | Refills: 0 | Status: DISCONTINUED | OUTPATIENT
Start: 2018-06-11 | End: 2018-06-11

## 2018-06-11 RX ORDER — ACETAMINOPHEN 500 MG
650 TABLET ORAL ONCE
Qty: 0 | Refills: 0 | Status: COMPLETED | OUTPATIENT
Start: 2018-06-11 | End: 2018-06-11

## 2018-06-11 RX ORDER — LOSARTAN POTASSIUM 100 MG/1
50 TABLET, FILM COATED ORAL DAILY
Qty: 0 | Refills: 0 | Status: DISCONTINUED | OUTPATIENT
Start: 2018-06-12 | End: 2018-06-12

## 2018-06-11 RX ORDER — LOSARTAN POTASSIUM 100 MG/1
25 TABLET, FILM COATED ORAL ONCE
Qty: 0 | Refills: 0 | Status: COMPLETED | OUTPATIENT
Start: 2018-06-11 | End: 2018-06-11

## 2018-06-11 RX ADMIN — LOSARTAN POTASSIUM 25 MILLIGRAM(S): 100 TABLET, FILM COATED ORAL at 05:15

## 2018-06-11 RX ADMIN — Medication 2: at 07:41

## 2018-06-11 RX ADMIN — ATORVASTATIN CALCIUM 80 MILLIGRAM(S): 80 TABLET, FILM COATED ORAL at 21:51

## 2018-06-11 RX ADMIN — LOSARTAN POTASSIUM 25 MILLIGRAM(S): 100 TABLET, FILM COATED ORAL at 12:25

## 2018-06-11 RX ADMIN — Medication 2: at 22:44

## 2018-06-11 RX ADMIN — Medication 81 MILLIGRAM(S): at 11:51

## 2018-06-11 RX ADMIN — CLOPIDOGREL BISULFATE 75 MILLIGRAM(S): 75 TABLET, FILM COATED ORAL at 11:51

## 2018-06-11 RX ADMIN — HEPARIN SODIUM 5000 UNIT(S): 5000 INJECTION INTRAVENOUS; SUBCUTANEOUS at 07:38

## 2018-06-11 RX ADMIN — Medication 650 MILLIGRAM(S): at 12:36

## 2018-06-11 RX ADMIN — HEPARIN SODIUM 5000 UNIT(S): 5000 INJECTION INTRAVENOUS; SUBCUTANEOUS at 17:41

## 2018-06-11 RX ADMIN — Medication 650 MILLIGRAM(S): at 12:18

## 2018-06-11 NOTE — PROGRESS NOTE ADULT - PROBLEM SELECTOR PLAN 1
NIHSS 0. CT head negative extensive areas extensive changes of chronic ischemic microangiopathy, early occipital infarcts are not excluded. CTA head/neck without significant stenosis, CTP without perfusion abnormality. Loaded with aspirin and plavix in ED. Passed bedside dysphagia.  - aspirin 81mg daily, plavix 75mg daily, atorvastatin 80mg qHS  - MRI head non con  - THOMAS w/ doppler  - PT/OT  - Aspiration precautions

## 2018-06-11 NOTE — CONSULT NOTE ADULT - ASSESSMENT
67 yo M with HTN and DM presents with dizziness and double vision starting 6AM this morning. NIHSS 0. Exam notable for horizontal nystagmus of left eye. Improved.    Problem/Plan - 1:  ·  Problem: R/O CVA (cerebral vascular accident).  Plan: NIHSS 0. Exam notable for horizontal nystagmus of left eye. No weakness or paresthesia, gait baseline. CT head negative extensive areas extensive changes of chronic ischemic microangiopathy, early occipital infarcts are not excluded. CTA head/neck without significant stenosis, CTP without perfusion abnormality. Loaded with aspirin and plavix in ED. Passed bedside dysphagia.  - aspirin 81mg daily, plavix 75mg daily, atorvastatin 80mg qHS  - MRI head non con  - THOMAS w/ doppler  - Permissive HTN to SBP<220/110

## 2018-06-11 NOTE — PHYSICAL THERAPY INITIAL EVALUATION ADULT - PERTINENT HX OF CURRENT PROBLEM, REHAB EVAL
67 yo male presents with double vision, CT head negative extensive areas extensive changes of chronic ischemic microangiopathy, early occipital infarcts are not excluded. CTA head/neck negative, CTP without perfusion abnormality, seen for PT eval hospital day # 3

## 2018-06-11 NOTE — OCCUPATIONAL THERAPY INITIAL EVALUATION ADULT - PERTINENT HX OF CURRENT PROBLEM, REHAB EVAL
MRI 6/11/18 with chronic microangiopathic ischemic disease, admitted to [facility] for further work up.

## 2018-06-11 NOTE — PHYSICAL THERAPY INITIAL EVALUATION ADULT - MODALITIES TREATMENT COMMENTS
EOMI no nystagmus intact convergence, divergence, face symmetrical, tongue midline, further CN assessment and vision screening TBA, limited due to stretcher for THOMAS awaiting.

## 2018-06-11 NOTE — CONSULT NOTE ADULT - SUBJECTIVE AND OBJECTIVE BOX
Patient is a 68y old  Male who presents with a chief complaint of diplopia (2018 17:50)       HPI:  69 yo M with HTN and DM presents with dizziness and double vision starting 6AM this morning. Last known normal was last night around 9pm. Pt denies neausea, vomiting, weakness, facial droop, slurred speech, word finding difficulties, paresthesias, chest pain, palpitations. Pt reports compliance with his medications. Denies prior CVA/TIA. NIHSS 0. CT head negative extensive areas extensive changes of chronic ischemic microangiopathy, early occipital infarcts are not excluded. CTA head/neck without significant stenosis, CTP without perfusion abnormality. Pt is admitted to [facility] for further CVA workup (2018 17:50)      PAST MEDICAL & SURGICAL HISTORY:  Hypertension  Diabetes mellitus  No significant past surgical history      MEDICATIONS  (STANDING):  aspirin enteric coated 81 milliGRAM(s) Oral daily  atorvastatin 80 milliGRAM(s) Oral at bedtime  clopidogrel Tablet 75 milliGRAM(s) Oral daily  dextrose 5%. 1000 milliLiter(s) (50 mL/Hr) IV Continuous <Continuous>  dextrose 50% Injectable 12.5 Gram(s) IV Push once  dextrose 50% Injectable 25 Gram(s) IV Push once  dextrose 50% Injectable 25 Gram(s) IV Push once  heparin  Injectable 5000 Unit(s) SubCutaneous every 8 hours  insulin lispro (HumaLOG) corrective regimen sliding scale   SubCutaneous Before meals and at bedtime  losartan 25 milliGRAM(s) Oral daily  potassium chloride  10 mEq/100 mL IVPB 10 milliEquivalent(s) IV Intermittent every 1 hour    MEDICATIONS  (PRN):  dextrose 40% Gel 15 Gram(s) Oral once PRN Blood Glucose LESS THAN 70 milliGRAM(s)/deciliter  glucagon  Injectable 1 milliGRAM(s) IntraMuscular once PRN Glucose LESS THAN 70 milligrams/deciliter      Social History: lives with his wife in a 5 flight walkup apartment, no home care services    Functional Level Prior to Admission: ADL independent, walks without assistive devices    FAMILY HISTORY:  No pertinent family history in first degree relatives      CBC Full  -  ( 2018 05:47 )  WBC Count : 6.5 K/uL  Hemoglobin : 15.2 g/dL  Hematocrit : 43.2 %  Platelet Count - Automated : 221 K/uL  Mean Cell Volume : 88.2 fL  Mean Cell Hemoglobin : 31.0 pg  Mean Cell Hemoglobin Concentration : 35.2 g/dL  Auto Neutrophil # : x  Auto Lymphocyte # : x  Auto Monocyte # : x  Auto Eosinophil # : x  Auto Basophil # : x  Auto Neutrophil % : x  Auto Lymphocyte % : x  Auto Monocyte % : x  Auto Eosinophil % : x  Auto Basophil % : x          138  |  100  |  7   ----------------------------<  183<H>  3.6   |  26  |  0.64    Ca    9.2      2018 05:46  Phos  3.2       Mg     1.9         TPro  8.2  /  Alb  4.3  /  TBili  0.9  /  DBili  x   /  AST  23  /  ALT  22  /  AlkPhos  57              Radiology:    < from: CT Brain Stroke Protocol (18 @ 12:03) >    EXAM:  CT BRAIN STROKE PROTOCOL                          PROCEDURE DATE:  2018          INTERPRETATION:  Axial images were obtained from the skull base to the   cranial vertex without intravenous contrast. Soft tissue and bone   algorithm images were evaluated.    Clinical information: History of hypertension and diabetes, awoke with   vertigo and blurry vision.    The ventricles, sulci and cisterns are normal in caliber for the   patient's age. There are extensive areas of hypodensity inthe cerebral   white matter compatible with severe changes of chronic ischemic   microangiopathy. Remote appearing lacunar infarcts are identified in the   bilateral basal ganglia and thalami. There are areas of hypodensity in   the bilateral medial occipital lobes that appear to reflect volume   averaging from prominent sulci, though early infarction is not excluded.   There is no evidence of posterior fossa infarction. There is no acute   intracranial hemorrhage, midline shift, mass effect or extracerebral   collection.    There is bilateral proptosis without orbital mass. Please clinically   correlate with regard to potential thyroid ophthalmopathy. The paranasal   sinuses and mastoids are clear. There is no lesion of the skull or skull   base.    IMPRESSION:    Extensive changes of chronic ischemic microangiopathy, as above. Early   occipital infarcts are not excluded.      < from: CT Perfusion w/ Maps w/ IV Cont (18 @ 12:00) >  EXAM:  CT PERFUSION W MAPS IC                          PROCEDURE DATE:  2018    ******PRELIMINARY REPORT******    ******PRELIMINARY REPORT******              INTERPRETATION:  [facility]  Preliminary Radiology Report  Call: [phone]  assistance Online chat: [email or web address]  Patient Name: ALMAZ AMARAL MRN: [id number]   (Age): 1950 68 Gender: M  Date of Exam: 2018 Accession: [id number]  Referring Physician: Lianne Dailey # of Images: [id number]  Ordered As: CT CEREBRAL PERFUSION ANALYSIS  CONFIDENTIALITY STATEMENT  This report is intended only for the use of the referring physician, and   only in accordance with law, If you received this in error, call   [phone]  Page 1 of 1  EXAM:  CT Brain Perfusion With Intravenous Contrast  CLINICAL HISTORY:  68 years old, male; Signs and symptoms; Dizziness and visual disturbance  TECHNIQUE:  Axial computed tomography images of thebrain with intravenous contrast   using cerebral perfusion  protocol. Post-processing parametric maps were created and reviewed.   These include cerebral  blood flow, cerebral blood volume and mean transit time.  COMPARISON:  No relevant prior studies available.    FINDINGS:  Cerebral blood flow: Unremarkable. Symmetric with no defects.  Cerebral blood volume: Unremarkable. Symmetric.  Mean transit time: Unremarkable. No delayed perfusion.      IMPRESSION:  No perfusion abnormality.        < from: CT Angio Head w/ IV Cont (18 @ 12:01) >  EXAM:  CT ANGIO BRAIN (W)AW IC                          EXAM:  CT ANGIO NECK (W)AW IC                          PROCEDURE DATE:  2018    ******PRELIMINARY REPORT******   ******PRELIMINARY REPORT******              INTERPRETATION:  [facility]  Preliminary Radiology Report  Call: [phone]  assistance Online chat: [email or web address]  Patient Name: ALMAZ AMARAL MRN: [id number]   (Age): 1950 68 Gender: M  Date of Exam: 2018 Accession: [id number]  Referring Physician: Lianne Dailey # of Images: [phone]  Ordered As: CTA HEAD  EXAM:  CT Angiography Head With Intravenous Contrast  CLINICAL HISTORY:  68 years old, male; Signs and symptoms; Headache  TECHNIQUE:  Axial computed tomographic angiography images of the head with   intravenous contrast using CT  angiography protocol.  MIP reconstructed images were created and reviewed.  Coronal and sagittal reformatted images were created and reviewed.  COMPARISON:  No relevant prior studies available.  FINDINGS:  Right internal carotid artery: No acute findings. Intracranial segment is   patent with no significant  stenosis. No aneurysm.  Right anterior cerebral artery: Unremarkable. No occlusion or significant   stenosis. No aneurysm.  Right middle cerebral artery: Unremarkable. No occlusion or significant   stenosis. No aneurysm.  Right posterior cerebral artery: Unremarkable. No occlusion or   significant stenosis. No aneurysm.  Right vertebral artery: Unremarkable as visualized.  Left internal carotid artery: No acute findings. Intracranial segment is   patent with no significant  stenosis. No aneurysm.  Left anterior cerebral artery: Unremarkable. No occlusion or significant   stenosis. No aneurysm.  Left middle cerebral artery: Unremarkable. No occlusion or significant   stenosis. No aneurysm.  Left posterior cerebral artery: Unremarkable. No occlusion or significant   stenosis. No aneurysm.  Left vertebral artery:Unremarkable as visualized.  Basilar artery: Unremarkable. No occlusion or significant stenosis. No   aneurysm.      IMPRESSION:  Normal intracranial arterial circulation.    _______________________________________________  EXAM:  CT Angiography NeckWith Intravenous Contrast  ALMAZ AMARAL Accession: [id number] MRN: [id number] | Preliminary Radiology   Report  Page 2 of 3  CLINICAL HISTORY:  68 years old, male; Signs and symptoms; Headache  TECHNIQUE:  Axial computed tomographic angiography images ofthe neck with   intravenous contrast using CT  angiography protocol.  MIP reconstructed images were created and reviewed.  Coronal and sagittal reformatted images were created and reviewed.  COMPARISON:  No relevant prior studies available.  FINDINGS:  VASCULATURE:  Right common carotid artery: Unremarkable. No significant stenosis. No   dissection or occlusion.  Right internal carotid artery: Unremarkable. Extracranial segment is   patent with no significant  stenosis. No dissection or occlusion.  Right external carotid artery: Unremarkable. No occlusion.  Right vertebral artery: Unremarkable. No significant stenosis. No   dissection or occlusion.  Left common carotid artery: Unremarkable. No significant stenosis. No   dissection or occlusion.  Left internal carotid artery: Unremarkable. Extracranial segment is   patent with no significant  stenosis. No dissection or occlusion.  Left external carotid artery: Unremarkable. No occlusion.  Left vertebral artery: Unremarkable. No significantstenosis. No   dissection or occlusion.  NECK:  Bones/joints: There is mild reversal of the normal cervical lordosis. No   acute fracture. No  dislocation.  Soft tissues: The prevertebral soft tissues are within normal limits.   There is a 7 mm left thyroid  hypodensity.  CAROTID STENOSIS REFERENCE USING NASCET CRITERIA:  % ICA stenosis = (1 - narrowest ICA diameter/diameter of distal cervical   ICA) x 100.  Mild - <50% stenosis.  Moderate - 50-69% stenosis.  Severe - 70-94% stenosis.  Near occlusion - 95-99% stenosis.  Occluded - 100% stenosis.      IMPRESSION:  No significant cervical arterial stenosis.            Vital Signs Last 24 Hrs  T(C): 36.5 (2018 06:00), Max: 37.4 (10 Wyatt 2018 22:32)  T(F): 97.7 (2018 06:00), Max: 99.3 (10 Wyatt 2018 22:32)  HR: 46 (2018 08:45) (46 - 60)  BP: 201/95 (2018 08:45) (165/77 - 234/102)  BP(mean): 136 (2018 08:45) (110 - 147)  RR: 18 (2018 08:45) (18 - 20)  SpO2: 95% (2018 08:45) (91% - 97%)    REVIEW OF SYSTEMS:    CONSTITUTIONAL: fatigue  EYES: No eye pain, visual disturbances, or discharge  ENMT:  No difficulty hearing, tinnitus, vertigo; No sinus or throat pain  NECK: No pain or stiffness  BREASTS: No pain, masses, or nipple discharge  RESPIRATORY: No cough, wheezing, chills or hemoptysis; No shortness of breath  CARDIOVASCULAR: No chest pain, palpitations, dizziness, or leg swelling  GASTROINTESTINAL: No abdominal or epigastric pain. No nausea, vomiting, or hematemesis; No diarrhea or constipation. No melena or hematochezia.  GENITOURINARY: No dysuria, frequency, hematuria, or incontinence  NEUROLOGICAL: denied blurry/ double vision  SKIN: No itching, burning, rashes, or lesions   LYMPH NODES: No enlarged glands  ENDOCRINE: No heat or cold intolerance; No hair loss  MUSCULOSKELETAL: No joint pain or swelling; No muscle, back, or extremity pain  PSYCHIATRIC: No depression, anxiety, mood swings, or difficulty sleeping  HEME/LYMPH: No easy bruising, or bleeding gums  ALLERGY AND IMMUNOLOGIC: No hives or eczema  VASCULAR: no swelling, erythema    Physical Exam: WDWN 69 yo  gentleman lying in semi Villegas's position, no current c/o blurry/ double vision    Head: normocephalic, atraumatic    Eyes: PERRLA, EOMI, no nystagmus, sclera anicteric    ENT: nasal discharge, uvula midline, no oropharyngeal erythema/exudate    Neck: supple, negative JVD, negative carotid bruits, no thyromegaly    Chest: CTA bilaterally, neg wheeze, rhonchi, rales, crackles, egophany    Cardiovascular: regular rate and rhythm, neg murmurs/rubs/gallops    Abdomen: soft, obese, non tender, negative rebound/guarding, normal bowel sounds, neg hepatosplenomegaly    Extremities: WWP, neg cyanosis/clubbing/edema, negative calf tenderness to palpation, negative Clarissa's sign    :     Neurologic Exam:    Alert and oriented to person, place, date/year, speech fluent w/o dysarthria, repetition intact, comprehension intact,     Cranial Nerves:     II:                       pupils equal, round and reactive to light, visual fields intact   III/ IV/VI:             extraocular movements intact, neg nystagmus, ptosis  V:                       facial sensation intact, V1-3 normal  VII:                     face symmetric, no droop, normal eye closure and smile  VIII:                    hearing intact to finger rub bilaterally  IX/ X:                 soft palate rise symmetrical  XI:                      head turning, shoulder shrug normal  XII:                     tongue midline    Motor Exam:    Upper Extremities:        Right:    5/5 /intrinsics                                                 5/5 deltoid                                                 5/5 biceps                                                 5/5 triceps                                                 5/5 wrist extensors-flexors                                                 negative pronator drift                                        Left:      5/5 /intrinsics                                                 5/5 deltoid                                                 5/5 biceps                                                 5/5 triceps                                                 5/5 wrist extensors/flexors                                                 negative pronator drift      Lower Extremities:         Right      5/5 hip flexors/adductors/abductors                                                   5/5 quadriceps/hamstrings                                                   5/5 dorsiflexors/plantar flexors/invertors-evertors                                       Left:       5/5 hip flexors/adductors/abductors                                                  5/5 quadriceps/hamstrings                                                  5/5 dorsiflexors/plantar flexors/invertors-evertors    Sensory:              intact to LT/PP in all UE/LE dermatomes    DTR:                   = biceps/     triceps/     brachioradialis                            = patella/   medial hamstring/    ankle                            neg clonus                            neg Babinski                            neg Hoffmans    Finger to Nose:  wnl    Heel to Shin:       wnl    Rapid Alternating movements:   wnl    Joint Position Sense:  intact    Romberg:  not tested    Tandem Walking:  not tested    Gait:  not tested        PM&R Impression:    1) r/o acute occipital infarct  2) no focal weakness      Recommendations:    1) Physical therapy focusing on therapeutic exercises, bed mobility/transfer out of bed evaluation, progressive ambulation with assistive devices, prn, stair negotiation    2) Anticipated Disposition Plan/Recommendations:  probable d/c home

## 2018-06-11 NOTE — OCCUPATIONAL THERAPY INITIAL EVALUATION ADULT - MD ORDER
Per chart, 67 yo M with HTN and DM presents with dizziness and double vision starting 6AM this morning. CT head negative extensive areas extensive changes of chronic ischemic microangiopathy, early occipital infarcts are not excluded. CTA head/neck without significant stenosis, CTP without perfusion abnormality.

## 2018-06-11 NOTE — PHYSICAL THERAPY INITIAL EVALUATION ADULT - GAIT DEVIATIONS NOTED, PT EVAL
decreased step length/small shuffling steps progressing to longer stride, no LOB (+) unsteadiness/decreased kristyn/decreased stride length

## 2018-06-11 NOTE — PROVIDER CONTACT NOTE (CRITICAL VALUE NOTIFICATION) - TEST AND RESULT REPORTED:
acid fast bronchio preliminary #2- growth of acid fast bacilli detected in broth and identification to follow

## 2018-06-11 NOTE — PHYSICAL THERAPY INITIAL EVALUATION ADULT - CRITERIA FOR SKILLED THERAPEUTIC INTERVENTIONS
anticipated discharge recommendation/risk reduction/prevention/therapy frequency/functional limitations in following categories/impairments found

## 2018-06-11 NOTE — PHYSICAL THERAPY INITIAL EVALUATION ADULT - MANUAL MUSCLE TESTING RESULTS, REHAB EVAL
bilateral UE grossly 5/5 grade strength; bilateral LE hip flexion 4-/5, hip extension 5/5, bilateral knee flex/ext 5/5 DF/PF 5/5 grade strength

## 2018-06-11 NOTE — OCCUPATIONAL THERAPY INITIAL EVALUATION ADULT - RANGE OF MOTION EXAMINATION, UPPER EXTREMITY
bilateral UE Active ROM was WFL  (within functional limits)/Guarding of right elbow AROM 2/2 pain from IV heplock

## 2018-06-11 NOTE — PROGRESS NOTE ADULT - ASSESSMENT
69 yo M with HTN and DM presents with dizziness and double vision starting 6AM this morning. NIHSS 0. Exam notable for horizontal nystagmus of left eye. Improved.

## 2018-06-11 NOTE — PROGRESS NOTE ADULT - SUBJECTIVE AND OBJECTIVE BOX
INTERVAL HPI/OVERNIGHT EVENTS: JUAN    SUBJECTIVE: Patient seen and examined at bedside. C/o 4/10 frontal headache.  States that vision is improved since admission and stable since yesterday.  Remaining ROS unremarkable.    OBJECTIVE:    VITAL SIGNS:  ICU Vital Signs Last 24 Hrs  T(C): 36.6 (11 Jun 2018 09:32), Max: 37.4 (10 Wyatt 2018 22:32)  T(F): 97.8 (11 Jun 2018 09:32), Max: 99.3 (10 Wyatt 2018 22:32)  HR: 50 (11 Jun 2018 12:30) (46 - 60)  BP: 190/93 (11 Jun 2018 12:30) (165/77 - 234/102)  BP(mean): 134 (11 Jun 2018 12:30) (110 - 147)  ABP: --  ABP(mean): --  RR: 18 (11 Jun 2018 12:30) (18 - 20)  SpO2: 98% (11 Jun 2018 12:30) (91% - 98%)      06-10 @ 07:01  -  06-11 @ 07:00  --------------------------------------------------------  IN: 130 mL / OUT: 500 mL / NET: -370 mL      CAPILLARY BLOOD GLUCOSE  POCT Blood Glucose.: 131 mg/dL (11 Jun 2018 12:50)      PHYSICAL EXAM:    General: NAD  HEENT: NC/AT; PERRL, clear conjunctiva  Neck: supple  Respiratory: CTA b/l  Cardiovascular: +S1/S2; RRR  Abdomen: soft, NT/ND; +BS x4  Extremities: WWP, 2+ peripheral pulses b/l; no LE edema  Skin: normal color and turgor; no rash  Neurological: AAOx3, cranial nerves intact, no nystagmus, intact finger to nose, muscle strength and sensation intact    MEDICATIONS:  MEDICATIONS  (STANDING):  aspirin enteric coated 81 milliGRAM(s) Oral daily  atorvastatin 80 milliGRAM(s) Oral at bedtime  clopidogrel Tablet 75 milliGRAM(s) Oral daily  dextrose 5%. 1000 milliLiter(s) (50 mL/Hr) IV Continuous <Continuous>  dextrose 50% Injectable 12.5 Gram(s) IV Push once  dextrose 50% Injectable 25 Gram(s) IV Push once  dextrose 50% Injectable 25 Gram(s) IV Push once  heparin  Injectable 5000 Unit(s) SubCutaneous every 8 hours  insulin lispro (HumaLOG) corrective regimen sliding scale   SubCutaneous Before meals and at bedtime    MEDICATIONS  (PRN):  dextrose 40% Gel 15 Gram(s) Oral once PRN Blood Glucose LESS THAN 70 milliGRAM(s)/deciliter  glucagon  Injectable 1 milliGRAM(s) IntraMuscular once PRN Glucose LESS THAN 70 milligrams/deciliter      ALLERGIES:  Allergies    No Known Allergies    Intolerances      LABS:                        15.2   6.5   )-----------( 221      ( 11 Jun 2018 05:47 )             43.2     06-11    138  |  100  |  7   ----------------------------<  183<H>  3.6   |  26  |  0.64    Ca    9.2      11 Jun 2018 05:46  Phos  3.2     06-11  Mg     1.9     06-11    RADIOLOGY & ADDITIONAL TESTS: Reviewed. INTERVAL HPI/OVERNIGHT EVENTS: JUAN    SUBJECTIVE: Patient seen and examined at bedside. C/o 4/10 frontal headache.  States that vision is improved since admission and stable since yesterday.  Remaining ROS unremarkable.    OBJECTIVE:    VITAL SIGNS:  ICU Vital Signs Last 24 Hrs  T(C): 36.6 (11 Jun 2018 09:32), Max: 37.4 (10 Wyatt 2018 22:32)  T(F): 97.8 (11 Jun 2018 09:32), Max: 99.3 (10 Wyatt 2018 22:32)  HR: 50 (11 Jun 2018 12:30) (46 - 60)  BP: 190/93 (11 Jun 2018 12:30) (165/77 - 234/102)  BP(mean): 134 (11 Jun 2018 12:30) (110 - 147)  RR: 18 (11 Jun 2018 12:30) (18 - 20)  SpO2: 98% (11 Jun 2018 12:30) (91% - 98%)      06-10 @ 07:01  -  06-11 @ 07:00  --------------------------------------------------------  IN: 130 mL / OUT: 500 mL / NET: -370 mL      CAPILLARY BLOOD GLUCOSE  POCT Blood Glucose.: 131 mg/dL (11 Jun 2018 12:50)      PHYSICAL EXAM:    General: NAD  HEENT: NC/AT; PERRL, clear conjunctiva  Neck: supple  Respiratory: CTA b/l  Cardiovascular: +S1/S2; RRR  Abdomen: soft, NT/ND; +BS x4  Extremities: WWP, 2+ peripheral pulses b/l; no LE edema  Skin: normal color and turgor; no rash  Neurological: AAOx3, cranial nerves intact, no nystagmus, intact finger to nose, muscle strength and sensation intact    MEDICATIONS:  MEDICATIONS  (STANDING):  aspirin enteric coated 81 milliGRAM(s) Oral daily  atorvastatin 80 milliGRAM(s) Oral at bedtime  clopidogrel Tablet 75 milliGRAM(s) Oral daily  dextrose 5%. 1000 milliLiter(s) (50 mL/Hr) IV Continuous <Continuous>  dextrose 50% Injectable 12.5 Gram(s) IV Push once  dextrose 50% Injectable 25 Gram(s) IV Push once  dextrose 50% Injectable 25 Gram(s) IV Push once  heparin  Injectable 5000 Unit(s) SubCutaneous every 8 hours  insulin lispro (HumaLOG) corrective regimen sliding scale   SubCutaneous Before meals and at bedtime    MEDICATIONS  (PRN):  dextrose 40% Gel 15 Gram(s) Oral once PRN Blood Glucose LESS THAN 70 milliGRAM(s)/deciliter  glucagon  Injectable 1 milliGRAM(s) IntraMuscular once PRN Glucose LESS THAN 70 milligrams/deciliter      ALLERGIES:  Allergies    No Known Allergies    Intolerances      LABS:                        15.2   6.5   )-----------( 221      ( 11 Jun 2018 05:47 )             43.2     06-11    138  |  100  |  7   ----------------------------<  183<H>  3.6   |  26  |  0.64    Ca    9.2      11 Jun 2018 05:46  Phos  3.2     06-11  Mg     1.9     06-11    RADIOLOGY & ADDITIONAL TESTS: Reviewed.

## 2018-06-11 NOTE — PHYSICAL THERAPY INITIAL EVALUATION ADULT - PLANNED THERAPY INTERVENTIONS, PT EVAL
bed mobility training/gait training/neuromuscular re-education/strengthening/transfer training/stair assessment/balance training/motor coordination training

## 2018-06-11 NOTE — PROGRESS NOTE ADULT - ATTENDING COMMENTS
Patient seen and examined with Resident.  Agree with above.  No double vision anymore.    His blood pressure has been high so Losartan started and increased.  THOMAS performed - Discussed with Dr. Perdue - no clot but has PFO.  MRI Brain - Discussed with Dr. Figueroa - no obvious stroke  - I still believe that he had a stroke based on his story.    Plan:  1) Secondary stroke prevention -   - Continue Aspirin 81mg and Plavix 75mg for dual antiplatelet   - Continue Atorvastatin 80mg for statin.    2) Stroke workup - completed though agree with LE dopplers for completion.  - f/u PT eval    3) Stroke risk factors -   a) HTN - Can start blood pressure meds for better control  b) DM - needs better control, as outpatient    4) DVT prophylaxis - heparin SQ TID    Can consider discharge home if LE dopplers negative and cleared by PT. Patient seen and examined with Resident.  Agree with above.  No double vision anymore.    His blood pressure has been high so Losartan started and increased.  THOMAS performed - Discussed with Dr. Perdue - no clot but has PFO.  MRI Brain - Discussed with Dr. Figueroa - no obvious stroke  - I still believe that he had a stroke based on his story.    Plan:  1) Secondary stroke prevention -   - Continue Aspirin 81mg and Plavix 75mg for dual antiplatelet   - Continue Atorvastatin 80mg for statin.    2) Stroke workup - completed though agree with LE dopplers for completion.  - f/u PT eval    3) Stroke risk factors -   a) HTN - Can start blood pressure meds for better control  b) DM - needs better control, as outpatient    4) DVT prophylaxis - heparin SQ TID    Can consider discharge home if LE dopplers negative and cleared by PT.  Would recommend Ophthalmology evaluation as outpatient

## 2018-06-11 NOTE — OCCUPATIONAL THERAPY INITIAL EVALUATION ADULT - NS ASR FOLLOW COMMAND OT EVAL
with repetition, primary language is [place]/able to follow multistep instructions/100% of the time

## 2018-06-11 NOTE — PROGRESS NOTE ADULT - PROBLEM SELECTOR PLAN 3
on metoprolol succinate 50mg ER qd and olmesartan 20mg qd  - losartan 50 mg daily  - no plans to restart metoprolol as pt persistently bradycardic

## 2018-06-11 NOTE — PHYSICAL THERAPY INITIAL EVALUATION ADULT - ADDITIONAL COMMENTS
Patient right hand dominant, wears reading glasses, independent with all functional skills at baseline, no use of DME reported

## 2018-06-12 ENCOUNTER — TRANSCRIPTION ENCOUNTER (OUTPATIENT)
Age: 68
End: 2018-06-12

## 2018-06-12 LAB
GLUCOSE BLDC GLUCOMTR-MCNC: 146 MG/DL — HIGH (ref 70–99)
GLUCOSE BLDC GLUCOMTR-MCNC: 147 MG/DL — HIGH (ref 70–99)
GLUCOSE BLDC GLUCOMTR-MCNC: 166 MG/DL — HIGH (ref 70–99)
GLUCOSE BLDC GLUCOMTR-MCNC: 199 MG/DL — HIGH (ref 70–99)

## 2018-06-12 PROCEDURE — 99233 SBSQ HOSP IP/OBS HIGH 50: CPT

## 2018-06-12 RX ORDER — LOSARTAN POTASSIUM 100 MG/1
1 TABLET, FILM COATED ORAL
Qty: 30 | Refills: 0 | OUTPATIENT
Start: 2018-06-12 | End: 2018-07-11

## 2018-06-12 RX ORDER — LOSARTAN POTASSIUM 100 MG/1
3 TABLET, FILM COATED ORAL
Qty: 90 | Refills: 0 | OUTPATIENT
Start: 2018-06-12 | End: 2018-07-11

## 2018-06-12 RX ORDER — ASPIRIN/CALCIUM CARB/MAGNESIUM 324 MG
1 TABLET ORAL
Qty: 0 | Refills: 0 | COMMUNITY
Start: 2018-06-12

## 2018-06-12 RX ORDER — ASPIRIN/CALCIUM CARB/MAGNESIUM 324 MG
1 TABLET ORAL
Qty: 30 | Refills: 0 | OUTPATIENT
Start: 2018-06-12 | End: 2018-07-11

## 2018-06-12 RX ORDER — ACETAMINOPHEN 500 MG
650 TABLET ORAL ONCE
Qty: 0 | Refills: 0 | Status: COMPLETED | OUTPATIENT
Start: 2018-06-12 | End: 2018-06-12

## 2018-06-12 RX ORDER — LOSARTAN POTASSIUM 100 MG/1
25 TABLET, FILM COATED ORAL ONCE
Qty: 0 | Refills: 0 | Status: COMPLETED | OUTPATIENT
Start: 2018-06-12 | End: 2018-06-12

## 2018-06-12 RX ORDER — LOSARTAN POTASSIUM 100 MG/1
75 TABLET, FILM COATED ORAL DAILY
Qty: 0 | Refills: 0 | Status: DISCONTINUED | OUTPATIENT
Start: 2018-06-13 | End: 2018-06-13

## 2018-06-12 RX ORDER — HYDRALAZINE HCL 50 MG
5 TABLET ORAL ONCE
Qty: 0 | Refills: 0 | Status: COMPLETED | OUTPATIENT
Start: 2018-06-12 | End: 2018-06-12

## 2018-06-12 RX ORDER — CLOPIDOGREL BISULFATE 75 MG/1
1 TABLET, FILM COATED ORAL
Qty: 30 | Refills: 0 | OUTPATIENT
Start: 2018-06-12 | End: 2018-07-11

## 2018-06-12 RX ORDER — ATORVASTATIN CALCIUM 80 MG/1
1 TABLET, FILM COATED ORAL
Qty: 30 | Refills: 0 | OUTPATIENT
Start: 2018-06-12 | End: 2018-07-11

## 2018-06-12 RX ORDER — AMLODIPINE BESYLATE 2.5 MG/1
5 TABLET ORAL DAILY
Qty: 0 | Refills: 0 | Status: DISCONTINUED | OUTPATIENT
Start: 2018-06-12 | End: 2018-06-13

## 2018-06-12 RX ORDER — OLMESARTAN MEDOXOMIL 5 MG/1
1 TABLET, FILM COATED ORAL
Qty: 0 | Refills: 0 | COMMUNITY

## 2018-06-12 RX ORDER — METOPROLOL TARTRATE 50 MG
1 TABLET ORAL
Qty: 0 | Refills: 0 | COMMUNITY

## 2018-06-12 RX ADMIN — Medication 650 MILLIGRAM(S): at 21:45

## 2018-06-12 RX ADMIN — Medication 5 MILLIGRAM(S): at 18:12

## 2018-06-12 RX ADMIN — LOSARTAN POTASSIUM 25 MILLIGRAM(S): 100 TABLET, FILM COATED ORAL at 16:07

## 2018-06-12 RX ADMIN — Medication 81 MILLIGRAM(S): at 11:51

## 2018-06-12 RX ADMIN — HEPARIN SODIUM 5000 UNIT(S): 5000 INJECTION INTRAVENOUS; SUBCUTANEOUS at 06:38

## 2018-06-12 RX ADMIN — HEPARIN SODIUM 5000 UNIT(S): 5000 INJECTION INTRAVENOUS; SUBCUTANEOUS at 01:31

## 2018-06-12 RX ADMIN — Medication 2: at 16:37

## 2018-06-12 RX ADMIN — LOSARTAN POTASSIUM 25 MILLIGRAM(S): 100 TABLET, FILM COATED ORAL at 15:12

## 2018-06-12 RX ADMIN — HEPARIN SODIUM 5000 UNIT(S): 5000 INJECTION INTRAVENOUS; SUBCUTANEOUS at 23:27

## 2018-06-12 RX ADMIN — ATORVASTATIN CALCIUM 80 MILLIGRAM(S): 80 TABLET, FILM COATED ORAL at 21:45

## 2018-06-12 RX ADMIN — LOSARTAN POTASSIUM 50 MILLIGRAM(S): 100 TABLET, FILM COATED ORAL at 06:38

## 2018-06-12 RX ADMIN — Medication 650 MILLIGRAM(S): at 22:45

## 2018-06-12 RX ADMIN — Medication 2: at 11:51

## 2018-06-12 RX ADMIN — HEPARIN SODIUM 5000 UNIT(S): 5000 INJECTION INTRAVENOUS; SUBCUTANEOUS at 16:07

## 2018-06-12 RX ADMIN — AMLODIPINE BESYLATE 5 MILLIGRAM(S): 2.5 TABLET ORAL at 18:11

## 2018-06-12 RX ADMIN — CLOPIDOGREL BISULFATE 75 MILLIGRAM(S): 75 TABLET, FILM COATED ORAL at 11:51

## 2018-06-12 NOTE — DISCHARGE NOTE ADULT - MEDICATION SUMMARY - MEDICATIONS TO TAKE
I will START or STAY ON the medications listed below when I get home from the hospital:    aspirin 81 mg oral delayed release tablet  -- 1 tab(s) by mouth once a day  -- Indication: For Stroke    losartan 25 mg oral tablet  -- 3 tab(s) by mouth once a day   -- Indication: For Hypertension    glipiZIDE 10 mg oral tablet  -- 1 tab(s) by mouth once a day  -- Indication: For Diabetes mellitus    Janumet 50 mg-1000 mg oral tablet  -- 1 tab(s) by mouth 2 times a day  -- Indication: For Diabetes mellitus    atorvastatin 80 mg oral tablet  -- 1 tab(s) by mouth once a day (at bedtime)  -- Indication: For Stroke    clopidogrel 75 mg oral tablet  -- 1 tab(s) by mouth once a day  -- Indication: For Stroke I will START or STAY ON the medications listed below when I get home from the hospital:    aspirin 81 mg oral delayed release tablet  -- 1 tab(s) by mouth once a day  -- Indication: For Stroke    losartan 100 mg oral tablet  -- 1 tab(s) by mouth once a day   -- Do not take this drug if you are pregnant.  It is very important that you take or use this exactly as directed.  Do not skip doses or discontinue unless directed by your doctor.  Some non-prescription drugs may aggravate your condition.  Read all labels carefully.  If a warning appears, check with your doctor before taking.    -- Indication: For Hypertension    glipiZIDE 10 mg oral tablet  -- 1 tab(s) by mouth once a day  -- Indication: For Diabetes mellitus    Janumet 50 mg-1000 mg oral tablet  -- 1 tab(s) by mouth 2 times a day  -- Indication: For Diabetes mellitus    atorvastatin 80 mg oral tablet  -- 1 tab(s) by mouth once a day (at bedtime)  -- Indication: For Stroke    clopidogrel 75 mg oral tablet  -- 1 tab(s) by mouth once a day  -- Indication: For Stroke    amLODIPine 5 mg oral tablet  -- 1 tab(s) by mouth once a day   -- It is very important that you take or use this exactly as directed.  Do not skip doses or discontinue unless directed by your doctor.  Some non-prescription drugs may aggravate your condition.  Read all labels carefully.  If a warning appears, check with your doctor before taking.    -- Indication: For Hypertension

## 2018-06-12 NOTE — DISCHARGE NOTE ADULT - CARE PLAN
Principal Discharge DX:	CVA (cerebral vascular accident)  Goal:	Prevent strokes  Assessment and plan of treatment:	You came to the hospital with vision changes caused by a TIA.  This is a temporary stroke that resolved.  Your CT head and MRI showed no acute strokes.  Your THOMAS of the heart showed a patent foramen ovale, which is a common defect that predisposes to stroke.  Your Ultrasound Doppler of the legs showed no clots.  You are at risk of future strokes with permanent neurologic damage and disability.  Take Aspirin and Atorvastatin as prescribed to prevent stroke.  Secondary Diagnosis:	Essential hypertension  Assessment and plan of treatment:	You were previously diagnosed with high blood pressure. Please continue on your home medications at this time and follow up with your PMD for further surveillance and management of your high blood pressure.  Secondary Diagnosis:	Diabetes mellitus  Assessment and plan of treatment:	You were previously diagnosed with Diabetes. Please continue on your home medications at this time and follow up with your PMD for further surveillance and management of your Diabetes.  It is important to improve your sugar control because Diabetes predisposes to stroke. Principal Discharge DX:	CVA (cerebral vascular accident)  Goal:	Prevent strokes  Assessment and plan of treatment:	You came to the hospital with vision changes caused by a TIA.  This is a temporary stroke that resolved.  Your CT head and MRI showed no acute strokes.  Your THOMAS of the heart showed a patent foramen ovale, which is a common heart defect that predisposes to stroke.  Your Ultrasound Doppler of the legs showed no clots.  You are at risk for future strokes with permanent neurologic damage and disability.  Take Aspirin, Plavix and Atorvastatin as prescribed to prevent stroke.  Secondary Diagnosis:	Essential hypertension  Assessment and plan of treatment:	You were previously diagnosed with high blood pressure. Please continue Losartan 75 mg daily at this time and follow up with your PMD for further surveillance and management of your high blood pressure.  Secondary Diagnosis:	Diabetes mellitus  Assessment and plan of treatment:	You were previously diagnosed with Diabetes. Please continue on your home medications at this time and follow up with your PMD for further surveillance and management of your Diabetes.  It is important to improve your sugar control because Diabetes predisposes to stroke. Principal Discharge DX:	CVA (cerebral vascular accident)  Goal:	Prevent strokes  Assessment and plan of treatment:	You came to the hospital with vision changes caused by a TIA.  This is a temporary stroke that resolved.  Your CT head and MRI showed no acute strokes.  Your THOMAS of the heart showed a patent foramen ovale, which is a common heart defect that predisposes to stroke.  Your Ultrasound Doppler of the legs showed no clots.  You are at risk for future strokes with permanent neurologic damage and disability.  Take Aspirin, Plavix and Atorvastatin as prescribed to prevent stroke.  Secondary Diagnosis:	Essential hypertension  Assessment and plan of treatment:	You were previously diagnosed with high blood pressure. Please continue Losartan 100 mg daily and Amlodipine 5mg daily at this time and follow up with your PMD for further surveillance and management of your high blood pressure.  Please stop taking your home Olmesartan and home Metoprolol.  Secondary Diagnosis:	Diabetes mellitus  Assessment and plan of treatment:	You were previously diagnosed with Diabetes. Please continue on your home medications at this time and follow up with your PMD for further surveillance and management of your Diabetes.  It is important to improve your sugar control because Diabetes predisposes to stroke.

## 2018-06-12 NOTE — DISCHARGE NOTE ADULT - MEDICATION SUMMARY - MEDICATIONS TO STOP TAKING
I will STOP taking the medications listed below when I get home from the hospital:    metoprolol succinate 50 mg oral tablet, extended release  -- 1 tab(s) by mouth once a day    olmesartan 20 mg oral tablet  -- 1 tab(s) by mouth once a day

## 2018-06-12 NOTE — DISCHARGE NOTE ADULT - PATIENT PORTAL LINK FT
You can access the [facility] Patient Portal, offered by [facility], by registering with the following website: [email or web address]

## 2018-06-12 NOTE — DISCHARGE NOTE ADULT - PLAN OF CARE
Prevent strokes You came to the hospital with vision changes caused by a TIA.  This is a temporary stroke that resolved.  Your CT head and MRI showed no acute strokes.  Your THOMAS of the heart showed a patent foramen ovale, which is a common defect that predisposes to stroke.  Your Ultrasound Doppler of the legs showed no clots.  You are at risk of future strokes with permanent neurologic damage and disability.  Take Aspirin and Atorvastatin as prescribed to prevent stroke. You were previously diagnosed with high blood pressure. Please continue on your home medications at this time and follow up with your PMD for further surveillance and management of your high blood pressure. You were previously diagnosed with Diabetes. Please continue on your home medications at this time and follow up with your PMD for further surveillance and management of your Diabetes.  It is important to improve your sugar control because Diabetes predisposes to stroke. You came to the hospital with vision changes caused by a TIA.  This is a temporary stroke that resolved.  Your CT head and MRI showed no acute strokes.  Your THOMAS of the heart showed a patent foramen ovale, which is a common heart defect that predisposes to stroke.  Your Ultrasound Doppler of the legs showed no clots.  You are at risk for future strokes with permanent neurologic damage and disability.  Take Aspirin, Plavix and Atorvastatin as prescribed to prevent stroke. You were previously diagnosed with high blood pressure. Please continue Losartan 75 mg daily at this time and follow up with your PMD for further surveillance and management of your high blood pressure. You were previously diagnosed with high blood pressure. Please continue Losartan 100 mg daily and Amlodipine 5mg daily at this time and follow up with your PMD for further surveillance and management of your high blood pressure.  Please stop taking your home Olmesartan and home Metoprolol.

## 2018-06-12 NOTE — DISCHARGE NOTE ADULT - CARE PROVIDERS DIRECT ADDRESSES
[email or web address]

## 2018-06-12 NOTE — PROGRESS NOTE ADULT - SUBJECTIVE AND OBJECTIVE BOX
Physical Medicine and Rehabilitation Progress Note:    Patient is a 68y old  Male who presents with a chief complaint of diplopia (12 Jun 2018 07:14)      HPI:  69 yo M with HTN and DM presents with dizziness and double vision starting 6AM this morning. Last known normal was last night around 9pm. Pt denies neausea, vomiting, weakness, facial droop, slurred speech, word finding difficulties, paresthesias, chest pain, palpitations. Pt reports compliance with his medications. Denies prior CVA/TIA. NIHSS 0. CT head negative extensive areas extensive changes of chronic ischemic microangiopathy, early occipital infarcts are not excluded. CTA head/neck without significant stenosis, CTP without perfusion abnormality. Pt is admitted to [facility] for further CVA workup (09 Jun 2018 17:50)                            15.2   6.5   )-----------( 221      ( 11 Jun 2018 05:47 )             43.2       06-11    138  |  100  |  7   ----------------------------<  183<H>  3.6   |  26  |  0.64    Ca    9.2      11 Jun 2018 05:46  Phos  3.2     06-11  Mg     1.9     06-11      Vital Signs Last 24 Hrs  T(C): 36.7 (12 Jun 2018 14:53), Max: 36.9 (12 Jun 2018 10:00)  T(F): 98.1 (12 Jun 2018 14:53), Max: 98.4 (12 Jun 2018 10:00)  HR: 72 (12 Jun 2018 12:56) (44 - 92)  BP: 183/88 (12 Jun 2018 12:56) (138/85 - 199/95)  BP(mean): 126 (12 Jun 2018 12:56) (107 - 136)  RR: 18 (12 Jun 2018 12:56) (16 - 18)  SpO2: 94% (12 Jun 2018 12:56) (94% - 98%)    MEDICATIONS  (STANDING):  aspirin enteric coated 81 milliGRAM(s) Oral daily  atorvastatin 80 milliGRAM(s) Oral at bedtime  clopidogrel Tablet 75 milliGRAM(s) Oral daily  dextrose 5%. 1000 milliLiter(s) (50 mL/Hr) IV Continuous <Continuous>  dextrose 50% Injectable 12.5 Gram(s) IV Push once  dextrose 50% Injectable 25 Gram(s) IV Push once  dextrose 50% Injectable 25 Gram(s) IV Push once  heparin  Injectable 5000 Unit(s) SubCutaneous every 8 hours  insulin lispro (HumaLOG) corrective regimen sliding scale   SubCutaneous Before meals and at bedtime  losartan 25 milliGRAM(s) Oral once    MEDICATIONS  (PRN):  dextrose 40% Gel 15 Gram(s) Oral once PRN Blood Glucose LESS THAN 70 milliGRAM(s)/deciliter  glucagon  Injectable 1 milliGRAM(s) IntraMuscular once PRN Glucose LESS THAN 70 milligrams/deciliter    Currently Undergoing Physical Therapy at bedside.    Functional Status Assessment:      Previous Level of Function:     · Ambulation Skills	independent	  · Transfer Skills	independent	  · ADL Skills	independent	  · Additional Comments	Patient right hand dominant, wears reading glasses, independent with all functional skills at baseline, no use of DME reported	    Cognitive Status Examination:   · Orientation	oriented to person, place, time and situation; person; place; time; situation	  · Level of Consciousness	alert	  · Follows Commands and Answers Questions	100% of the time	  · Personal Safety and Judgment	intact	  · Short Term Memory	TBA	  · Long Term Memory	TBA	    Peripheral Neurovascular:     · Edema 1+ (Trace)	unremarkable	    Skin:   Skin:  · Skin Integrity	intact; unremarkable	    Range of Motion Exam:   · Active Range of Motion Examination	bilateral  lower extremity Active ROM was WFL (within functional limits); bilateral upper extremity Active ROM was WFL (within functional limits)	    Manual Muscle Testing:   · Manual Muscle Testing Results	bilateral UE grossly 5/5 grade strength; bilateral LE hip flexion 4-/5, hip extension 5/5, bilateral knee flex/ext 5/5 DF/PF 5/5 grade strength	    Muscle Tone Assessment:   · Muscle Tone Assessment	bilateral upper extremities; bilateral lower extremities; normal	    Bed Mobility: Rolling/Turning:     · Level of [place]	supervision	  · Physical Assist/Nonphysical Assist	supervision	    Bed Mobility: Scooting/Bridging:     · Level of [place]	supervision	  · Physical Assist/Nonphysical Assist	supervision	    Bed Mobility: Sit to Supine:     · Level of [place]	supervision	  · Physical Assist/Nonphysical Assist	verbal cues; 1 person assist	  · Assistive Device	bed rails	    Bed Mobility: Supine to Sit:     · Level of [place]	minimum assist (75% patients effort)	  · Physical Assist/Nonphysical Assist	1 person assist; verbal cues	  · Assistive Device	bed rails; elevated [facility]	    Bed Mobility Analysis:     · Bed Mobility Limitations	decreased ability to use arms for pushing/pulling; decreased ability to use legs for bridging/pushing	  · Impairments Contributing to Impaired Bed Mobility	decreased strength	    Transfer: Sit to Stand:     · Level of [place]	minimum assist (75% patients effort)	  · Physical Assist/Nonphysical Assist	1 person assist	  · Weight-Bearing Restrictions	HHA	  · Assistive Device	no DME	    Transfer: Stand to Sit:     · Level of [place]	minimum assist (75% patients effort); HHA	  · Physical Assist/Nonphysical Assist	1 person assist	  · Weight-Bearing Restrictions	no DME; full weight-bearing	    Sit/Stand Transfer Safety Analysis:     · Transfer Safety Concerns Noted	decreased balance during turns	  · Impairments Contributing to Impaired Transfers	decreased strength; impaired balance	    Gait Skills:     · Level of [place]	minimum assist (75% patients effort); HHA	  · Physical Assist/Nonphysical Assist	1 person assist; verbal cues	  · Weight-Bearing Restrictions	full weight-bearing	  · Assistive Device	no use of DME	  · Gait Distance	25 feet	    Gait Analysis:     · Gait Pattern Used	3-point gait	  · Gait Deviations Noted	decreased kristyn; decreased step length; decreased stride length; small shuffling steps progressing to longer stride, no LOB (+) unsteadiness	  · Impairments Contributing to Gait Deviations	impaired balance; decreased strength	    Stair Negotiation:     · Level of [place]	TBA	    Balance Skills Assessment:     · Sitting Balance: Static	fair balance	  · Sitting Balance: Dynamic	fair balance	  · Sit-to-Stand Balance	fair minus	  · Standing Balance: Static	fair balance	  · Standing Balance: Dynamic	fair minus	  · Systems Impairment Contributing to Balance Disturbance	musculoskeletal; neuromuscular	  · Identified Impairments Contributing to Balance Disturbance	decreased strength	    Sensory Examination:   Sensory Examination:    Grossly Intact:   · Gross Sensory Examination	Grossly Intact; Left UE; Right UE; Left LE; Right LE	      Light Touch Sensation:   · Left UE	within normal limits	  · Right UE	within normal limits	  · Left LE	within normal limits	  · Right LE	within normal limits	      Fine Motor Coordination:   Fine Motor Coordination Examination:    Fine Motor Coordination:   · Left Hand, Finger to Nose	TBA	  · Right Hand, Finger to Nose	TBA	  · Left Hand Thumb/Finger Opposition Skills	TBA	  · Right Hand Thumb/Finger Opposition Skills	TBA	  · Left Hand, Manipulation of Objects	TBA	  · Right Hand, Manipulation of Objects	TBA	  · Left Hand, Graphomotor Skills	TBA	  · Right Hand, Graphomotor Skills	TBA	  · Left Hand, Diadochokinesis Skills	TBA	  · Right Hand, Diadochokinesis Skills	TBA	    Treatment Location:   · Comments	EOMI no nystagmus intact convergence, divergence, face symmetrical, tongue midline, further CN assessment and vision screening TBA, limited due to stretcher for THOMAS awaiting.	    Clinical Impressions:   · Criteria for Skilled Therapeutic Interventions	impairments found; risk reduction/prevention; functional limitations in following categories; anticipated discharge recommendation; therapy frequency	  · Impairments Found (describe specific impairments)	aerobic capacity/endurance; gait, locomotion, and balance; muscle strength; gross motor; posture	  · Functional Limitations in Following Categories (describe specific limitations)	self-care; home management; work; community/leisure	  · Risk Reduction/Prevention (Describe Specific Areas of risk reduction/prevention)	risk factors	  · Risk Areas	fall	  · Rehab Potential	good, to achieve stated therapy goals	  · Therapy Frequency	2-3x/week	          PM&R Impression: as above    Disposition Plan Recommendations: d/c home with home physical therapy, home care services

## 2018-06-12 NOTE — DISCHARGE NOTE ADULT - CARE PROVIDER_API CALL
Mikki Mccauley), Neurology; Vascular Neurology  [address], [place] [address]  Phone: [phone]  Fax: [phone]

## 2018-06-12 NOTE — PROGRESS NOTE ADULT - ASSESSMENT
69 yo M with HTN and DM presents with dizziness and double vision starting 6AM this morning. NIHSS 0. Exam notable for horizontal nystagmus of left eye. Improved.    Problem/Plan - 1:  ·  Problem: R/O CVA (cerebral vascular accident).  Plan: NIHSS 0. CT head negative extensive areas extensive changes of chronic ischemic microangiopathy, early occipital infarcts are not excluded. CTA head/neck without significant stenosis, CTP without perfusion abnormality. Loaded with aspirin and plavix in ED. Passed bedside dysphagia.  - aspirin 81mg daily, plavix 75mg daily, atorvastatin 80mg qHS  - MRI head non con  - THOMAS w/ doppler  - Aspiration precautions.

## 2018-06-12 NOTE — DISCHARGE NOTE ADULT - HOSPITAL COURSE
69 yo M with HTN and DM presents with dizziness and double vision starting 6AM. NIHSS 0. CT head negative extensive areas extensive changes of chronic ischemic microangiopathy, early occipital infarcts are not excluded. CTA head/neck without significant stenosis, CTP without perfusion abnormality. MRI with no acute infarct.  Discharged on aspirin with Stroke follow-up. On the day of discharge, the patient was seen and examined. Symptoms improved. Vital signs are stable. Labs and imaging reviewed. Patient is medically optimized and hemodynamically stable. Return precautions discussed, medication teach back done, and importance of physician followup emphasized. The patient verbalized understanding. 67 yo M with HTN and DM presents with dizziness and double vision starting 6AM. NIHSS 0. CT head negative extensive areas extensive changes of chronic ischemic microangiopathy, early occipital infarcts are not excluded. CTA head/neck without significant stenosis, CTP without perfusion abnormality. MRI with no acute infarct.  Discharged on aspirin and plavix for TIA, with Stroke follow-up. On the day of discharge, the patient was seen and examined. Symptoms improved. Vital signs are stable. Labs and imaging reviewed. Patient is medically optimized and hemodynamically stable. Return precautions discussed, medication teach back done, and importance of physician followup emphasized. The patient verbalized understanding. 69 yo M with HTN and DM presents with dizziness and double vision starting 6AM. NIHSS 0. CT head negative extensive areas extensive changes of chronic ischemic microangiopathy, early occipital infarcts are not excluded. CTA head/neck without significant stenosis, CTP without perfusion abnormality. MRI with no acute infarct.  Discharged on aspirin and plavix for TIA, with Stroke follow-up.  He was also discharged on Valsartan and Amlodipine for blood pressure management. On the day of discharge, the patient was seen and examined. Symptoms improved. Vital signs are stable. Labs and imaging reviewed. Patient is medically optimized and hemodynamically stable. Return precautions discussed, medication teach back done, and importance of physician followup emphasized. The patient verbalized understanding. 67 yo M with HTN and DM presents with dizziness and double vision starting 6AM. NIHSS 0. CT head negative extensive areas extensive changes of chronic ischemic microangiopathy, early occipital infarcts are not excluded. CTA head/neck without significant stenosis, CTP without perfusion abnormality. MRI with no acute infarct but still concern for stroke given his presentation.  Therefore, will discharge him on aspirin and plavix for TIA, with Stroke follow-up.  He was also discharged on Valsartan and Amlodipine for blood pressure management. The beta blocker was held due to bradycardia.  On the day of discharge, the patient was seen and examined. Symptoms improved. Vital signs are stable. Labs and imaging reviewed. Patient is medically optimized and hemodynamically stable. Return precautions discussed, medication teach back done, and importance of physician followup emphasized. The patient verbalized understanding.

## 2018-06-13 VITALS — HEART RATE: 102 BPM | SYSTOLIC BLOOD PRESSURE: 148 MMHG | OXYGEN SATURATION: 97 % | DIASTOLIC BLOOD PRESSURE: 76 MMHG

## 2018-06-13 LAB
GLUCOSE BLDC GLUCOMTR-MCNC: 153 MG/DL — HIGH (ref 70–99)
GLUCOSE BLDC GLUCOMTR-MCNC: 209 MG/DL — HIGH (ref 70–99)

## 2018-06-13 PROCEDURE — 70450 CT HEAD/BRAIN W/O DYE: CPT

## 2018-06-13 PROCEDURE — 0042T: CPT

## 2018-06-13 PROCEDURE — 85027 COMPLETE CBC AUTOMATED: CPT

## 2018-06-13 PROCEDURE — 80048 BASIC METABOLIC PNL TOTAL CA: CPT

## 2018-06-13 PROCEDURE — 82962 GLUCOSE BLOOD TEST: CPT

## 2018-06-13 PROCEDURE — 93312 ECHO TRANSESOPHAGEAL: CPT

## 2018-06-13 PROCEDURE — 70551 MRI BRAIN STEM W/O DYE: CPT

## 2018-06-13 PROCEDURE — 99291 CRITICAL CARE FIRST HOUR: CPT | Mod: 25

## 2018-06-13 PROCEDURE — 86803 HEPATITIS C AB TEST: CPT

## 2018-06-13 PROCEDURE — 83036 HEMOGLOBIN GLYCOSYLATED A1C: CPT

## 2018-06-13 PROCEDURE — 85025 COMPLETE CBC W/AUTO DIFF WBC: CPT

## 2018-06-13 PROCEDURE — 70498 CT ANGIOGRAPHY NECK: CPT

## 2018-06-13 PROCEDURE — 85730 THROMBOPLASTIN TIME PARTIAL: CPT

## 2018-06-13 PROCEDURE — 70496 CT ANGIOGRAPHY HEAD: CPT

## 2018-06-13 PROCEDURE — 71045 X-RAY EXAM CHEST 1 VIEW: CPT

## 2018-06-13 PROCEDURE — 80061 LIPID PANEL: CPT

## 2018-06-13 PROCEDURE — 97116 GAIT TRAINING THERAPY: CPT

## 2018-06-13 PROCEDURE — 84100 ASSAY OF PHOSPHORUS: CPT

## 2018-06-13 PROCEDURE — 86901 BLOOD TYPING SEROLOGIC RH(D): CPT

## 2018-06-13 PROCEDURE — 83735 ASSAY OF MAGNESIUM: CPT

## 2018-06-13 PROCEDURE — 85610 PROTHROMBIN TIME: CPT

## 2018-06-13 PROCEDURE — 93970 EXTREMITY STUDY: CPT

## 2018-06-13 PROCEDURE — 97161 PT EVAL LOW COMPLEX 20 MIN: CPT

## 2018-06-13 PROCEDURE — 93005 ELECTROCARDIOGRAM TRACING: CPT

## 2018-06-13 PROCEDURE — 86900 BLOOD TYPING SEROLOGIC ABO: CPT

## 2018-06-13 PROCEDURE — 80053 COMPREHEN METABOLIC PANEL: CPT

## 2018-06-13 PROCEDURE — 36415 COLL VENOUS BLD VENIPUNCTURE: CPT

## 2018-06-13 PROCEDURE — 84443 ASSAY THYROID STIM HORMONE: CPT

## 2018-06-13 PROCEDURE — 86850 RBC ANTIBODY SCREEN: CPT

## 2018-06-13 PROCEDURE — 99238 HOSP IP/OBS DSCHRG MGMT 30/<: CPT

## 2018-06-13 RX ORDER — AMLODIPINE BESYLATE 2.5 MG/1
1 TABLET ORAL
Qty: 30 | Refills: 0 | OUTPATIENT
Start: 2018-06-13 | End: 2018-07-12

## 2018-06-13 RX ADMIN — AMLODIPINE BESYLATE 5 MILLIGRAM(S): 2.5 TABLET ORAL at 05:26

## 2018-06-13 RX ADMIN — CLOPIDOGREL BISULFATE 75 MILLIGRAM(S): 75 TABLET, FILM COATED ORAL at 11:53

## 2018-06-13 RX ADMIN — Medication 4: at 11:52

## 2018-06-13 RX ADMIN — Medication 2: at 07:01

## 2018-06-13 RX ADMIN — LOSARTAN POTASSIUM 75 MILLIGRAM(S): 100 TABLET, FILM COATED ORAL at 05:26

## 2018-06-13 RX ADMIN — Medication 81 MILLIGRAM(S): at 11:52

## 2018-06-13 RX ADMIN — HEPARIN SODIUM 5000 UNIT(S): 5000 INJECTION INTRAVENOUS; SUBCUTANEOUS at 07:12

## 2018-06-18 DIAGNOSIS — H55.09 OTHER FORMS OF NYSTAGMUS: ICD-10-CM

## 2018-06-18 DIAGNOSIS — R42 DIZZINESS AND GIDDINESS: ICD-10-CM

## 2018-06-18 DIAGNOSIS — I10 ESSENTIAL (PRIMARY) HYPERTENSION: ICD-10-CM

## 2018-06-18 DIAGNOSIS — G45.9 TRANSIENT CEREBRAL ISCHEMIC ATTACK, UNSPECIFIED: ICD-10-CM

## 2018-06-18 DIAGNOSIS — H53.2 DIPLOPIA: ICD-10-CM
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
## 2018-06-18 DIAGNOSIS — R00.1 BRADYCARDIA, UNSPECIFIED: ICD-10-CM

## 2018-06-18 DIAGNOSIS — Q21.1 ATRIAL SEPTAL DEFECT: ICD-10-CM

## 2018-06-18 DIAGNOSIS — R26.81 UNSTEADINESS ON FEET: ICD-10-CM

## 2018-06-18 DIAGNOSIS — E11.9 TYPE 2 DIABETES MELLITUS WITHOUT COMPLICATIONS: ICD-10-CM

## 2018-06-18 DIAGNOSIS — H53.8 OTHER VISUAL DISTURBANCES: ICD-10-CM

## 2018-06-18 DIAGNOSIS — T50.995A ADVERSE EFFECT OF OTHER DRUGS, MEDICAMENTS AND BIOLOGICAL SUBSTANCES, INITIAL ENCOUNTER: ICD-10-CM

## 2018-07-01 PROBLEM — Z00.00 ENCOUNTER FOR PREVENTIVE HEALTH EXAMINATION: Status: ACTIVE | Noted: 2018-07-01

## 2018-07-19 ENCOUNTER — APPOINTMENT (OUTPATIENT)
Dept: NEUROLOGY | Facility: CLINIC | Age: 68
End: 2018-07-19
Payer: MEDICARE

## 2018-07-19 VITALS
HEART RATE: 57 BPM | TEMPERATURE: 98.3 F | DIASTOLIC BLOOD PRESSURE: 75 MMHG | HEIGHT: 63 IN | OXYGEN SATURATION: 95 % | BODY MASS INDEX: 34.2 KG/M2 | SYSTOLIC BLOOD PRESSURE: 168 MMHG | WEIGHT: 193 LBS

## 2018-07-19 DIAGNOSIS — Z78.9 OTHER SPECIFIED HEALTH STATUS: ICD-10-CM

## 2018-07-19 PROCEDURE — 99215 OFFICE O/P EST HI 40 MIN: CPT

## 2018-07-31 PROBLEM — Z78.9 DOES NOT USE ILLICIT DRUGS: Status: ACTIVE | Noted: 2018-07-19

## 2018-10-15 ENCOUNTER — RX RENEWAL (OUTPATIENT)
Age: 68
End: 2018-10-15

## 2018-10-24 ENCOUNTER — APPOINTMENT (OUTPATIENT)
Dept: NEUROLOGY | Facility: CLINIC | Age: 68
End: 2018-10-24
Payer: MEDICARE

## 2018-10-24 VITALS
HEART RATE: 67 BPM | BODY MASS INDEX: 34.2 KG/M2 | WEIGHT: 193 LBS | OXYGEN SATURATION: 98 % | HEIGHT: 63 IN | SYSTOLIC BLOOD PRESSURE: 163 MMHG | DIASTOLIC BLOOD PRESSURE: 76 MMHG

## 2018-10-24 PROCEDURE — 99214 OFFICE O/P EST MOD 30 MIN: CPT

## 2018-10-24 RX ORDER — CLOPIDOGREL BISULFATE 75 MG/1
75 TABLET, FILM COATED ORAL
Qty: 30 | Refills: 2 | Status: DISCONTINUED | COMMUNITY
Start: 2018-07-19 | End: 2018-10-24

## 2018-10-24 RX ORDER — ATORVASTATIN CALCIUM 80 MG/1
80 TABLET, FILM COATED ORAL
Qty: 30 | Refills: 5 | Status: DISCONTINUED | COMMUNITY
End: 2018-10-24

## 2019-01-16 ENCOUNTER — APPOINTMENT (OUTPATIENT)
Dept: NEUROLOGY | Facility: CLINIC | Age: 69
End: 2019-01-16
Payer: MEDICARE

## 2019-01-16 VITALS
BODY MASS INDEX: 34.2 KG/M2 | SYSTOLIC BLOOD PRESSURE: 154 MMHG | OXYGEN SATURATION: 96 % | DIASTOLIC BLOOD PRESSURE: 79 MMHG | HEART RATE: 77 BPM | HEIGHT: 63 IN | WEIGHT: 193 LBS

## 2019-01-16 LAB
CHOLEST SERPL-MCNC: 110 MG/DL
CHOLEST/HDLC SERPL: 2 RATIO
HDLC SERPL-MCNC: 54 MG/DL
LDLC SERPL CALC-MCNC: 38 MG/DL
TRIGL SERPL-MCNC: 92 MG/DL

## 2019-01-16 PROCEDURE — 99214 OFFICE O/P EST MOD 30 MIN: CPT

## 2019-01-16 RX ORDER — ATORVASTATIN CALCIUM 40 MG/1
40 TABLET, FILM COATED ORAL DAILY
Qty: 90 | Refills: 1 | Status: DISCONTINUED | COMMUNITY
Start: 2018-10-24 | End: 2019-01-16

## 2019-01-16 NOTE — HISTORY OF PRESENT ILLNESS
[FreeTextEntry1] : 68 year-old male presents with his wife for follow up for double vision that was felt to be TIA.  No more double vision.  No specific weakness or numbness.  \par \par He's compliant with his medications.  He's on - \par - Aspirin 81mg.  No bleeding in urine or stool.  \par - Atorvastatin 80mg - No muscle pains or cramps.\par \par Stroke risk factors - Compliant with his medications.\par HTN - elevated today\par DM - Careful with diet.  His wife has eliminated sugar, rice, salt\par \par He exercises daily with one hour walks.\par \par His mouth feels dry when he wakes up.  It started after the stroke.  He and his wife thinks that it's related to the medications.  \par Note: He and his wife would prefer to decrease the number of medications.

## 2019-01-16 NOTE — ASSESSMENT
[FreeTextEntry1] : 68 year-old male with multiple stroke risk factors presents with his wife for follow up for double vision that was felt to be TIA.  His neurological exam is intact.\par \par Plan:\par 1) Secondary stroke prevention - Emphasized importance of taking both Aspirin and statin for protection against having another stroke.\par - Continue Aspirin 81mg for single antiplatelet.  Recommended that he take Aspirin with food..\par - Can decrease the value of Atorvastatin to 20mg given his improvement.  LDL goal is less than 70.\par \par 2) Stroke risk factors - \par a) HTN - as per his PMD.  Goal is less than 130/80.  Would appreciate recent lipid profile.\par b) DM - as per his PMD.  Would appreciate recent hemoglobin A1C.  \par \par Thank you for allowing me to participate in the care of your patient.  If you have any questions, please feel free to call me at [phone].

## 2019-01-16 NOTE — CONSULT LETTER
[Dear  ___] : Dear  [unfilled], [Courtesy Letter:] : I had the pleasure of seeing your patient, [unfilled], in my office today. [FreeTextEntry2] : Momo Hanley MD\par [address] [place] [address]\[place], [place]

## 2019-01-16 NOTE — PHYSICAL EXAM
[FreeTextEntry1] : General: sitting on exam table comfortably, NAD\par CV: RRR	\par Extremities: FROMx4, 2+ radial pulses bilaterally\par 			\par Neurological exam:	\par Mental status: AA&O x 3, fluent, spontaneous speech, no dysarthria, follows all commands, able give full history of present and past events, memory intact, normal attention span\par Cranial nerves: EOMI, PERRL+, VFF, no nystagmus, facial sensation intact, no facial droop, tongue midline\par Motor: No pronator drift, 5/5 x4, normal tone and bulk\par Sensory: Intact light touch bilaterally\par Cerebellar: FNF and HKS intact bilaterally\par Gait: steady

## 2019-01-16 NOTE — REVIEW OF SYSTEMS
[As Noted in HPI] : as noted in HPI [Negative] : Psychiatric [de-identified] : Minimal snoring at night. [FreeTextEntry6] : Minimal snoring at night.

## 2019-09-25 ENCOUNTER — APPOINTMENT (OUTPATIENT)
Dept: NEUROLOGY | Facility: CLINIC | Age: 69
End: 2019-09-25
Payer: MEDICARE

## 2019-09-25 VITALS
OXYGEN SATURATION: 95 % | SYSTOLIC BLOOD PRESSURE: 161 MMHG | WEIGHT: 196 LBS | BODY MASS INDEX: 34.73 KG/M2 | HEART RATE: 60 BPM | DIASTOLIC BLOOD PRESSURE: 74 MMHG | TEMPERATURE: 98 F | HEIGHT: 63 IN

## 2019-09-25 DIAGNOSIS — E11.9 TYPE 2 DIABETES MELLITUS W/OUT COMPLICATIONS: ICD-10-CM

## 2019-09-25 DIAGNOSIS — I10 ESSENTIAL (PRIMARY) HYPERTENSION: ICD-10-CM

## 2019-09-25 DIAGNOSIS — R47.89 OTHER SPEECH DISTURBANCES: ICD-10-CM

## 2019-09-25 PROCEDURE — 99214 OFFICE O/P EST MOD 30 MIN: CPT

## 2019-09-25 RX ORDER — ASPIRIN ENTERIC COATED TABLETS 81 MG 81 MG/1
81 TABLET, DELAYED RELEASE ORAL DAILY
Qty: 90 | Refills: 3 | Status: ACTIVE | COMMUNITY
Start: 1900-01-01 | End: 1900-01-01

## 2019-09-25 NOTE — CONSULT LETTER
[Courtesy Letter:] : I had the pleasure of seeing your patient, [unfilled], in my office today. [Dear  ___] : Dear  [unfilled], [FreeTextEntry2] : Momo Hanley MD\par [address] [place] [address]\[place], [place]

## 2019-09-25 NOTE — ASSESSMENT
[FreeTextEntry1] : 69 year-old male with multiple stroke risk factors presents for follow up for double vision that was felt to be TIA but also reports several other more concerning complaints - speech and memory difficulty.  His neurological exam is relatively intact.\par \par Plan:\par 1) Secondary stroke prevention - \par - Continue Aspirin 81mg for single antiplatelet.  Recommended that he take Aspirin with food..\par - Continue Atorvastatin 20mg for statin.  LDL goal is less than 70.  Would appreciate recent lipid profile.\par \par 2) Stroke risk factors - \par a) HTN - deferred to his PMD.  Goal is less than 130/80.  \par b) DM - deferred to his PMD.  Would appreciate recent hemoglobin A1C.  \par \par 3) Referred him to Speech therapy to evaluate his speech output.\par 4) Would appreciate additional input from his wife Ibeth, [phone], regarding his memory since he implies that there's no problem.  Can consider referral to Neuropsychological testing if there's any question.\par \par Note: Deferred questions regarding urinary incontinence to his PMD.\par \par Thank you for allowing me to participate in the care of your patient.  If you have any questions, please feel free to call me at [phone].

## 2019-09-25 NOTE — HISTORY OF PRESENT ILLNESS
[FreeTextEntry1] : 69 year-old male presents by himself for follow up for double vision that was felt to be TIA.  He hasn't had any more episodes of double vision.  No specific weakness or numbness.  \par \par He's had trouble speaking since hospitalization.  He knows what he wants to say but can't get the words out sometimes, even in Yoruba.  His family has noticed that he stutters too much.\par \par His family has also noted increased temper that he acknowledges.  He can lose his temper faster than before.  For example, when his shaver wasn't working.  He doesn't think that he's depressed.  He doesn't have a lot of activity at home now that he's retired.\par \par His family also notes decreased memory, though he thinks that it's okay.  He's able to pay all of the bills without a problem.  He's never paid the incorrect amount when shopping.\par \par He's compliant with his medications.  He's in charge of his medications with supervision by his wife.  \par He's on - \par - Aspirin 81mg.  No bleeding in urine or stool.  \par - Atorvastatin 80mg - No muscle pains or cramps.\par \par Stroke risk factors - Compliant with his medications.\par HTN - elevated today.  He doesn't measure it at home.  He just takes his pills.\par DM - He measures finger sticks - 125 - 210.  Careful with diet but doesn't know his recent hemoglobin A1C.\par Note: He followed up with his PMD in June.\par \par He exercises daily with walks.

## 2019-09-25 NOTE — REVIEW OF SYSTEMS
[As Noted in HPI] : as noted in HPI [Negative] : Heme/Lymph [Incontinence] : incontinence [FreeTextEntry6] : Minimal snoring at night.

## 2019-09-25 NOTE — PHYSICAL EXAM
[FreeTextEntry1] : General: sitting on exam table comfortably, NAD\par Eyes: anicteric sclera\par CV: RRR	\par Extremities: FROMx4, 2+ radial pulses bilaterally\par 			\par Neurological exam:	\par Mental status: AA&O x 3, fluent - able name 5/6 words, describes picture fully, able read words, spontaneous speech, no dysarthria, follows 2-step commands across midline, able give full history of present and past events, memory intact, normal attention span\par Cranial nerves: EOMI, PERRL+, VFF, no nystagmus, facial sensation intact, no facial droop, tongue midline\par Motor: No pronator drift, 5/5 x4, normal tone and bulk\par Sensory: Intact light touch, temperature bilaterally\par Cerebellar: FNF and HKS intact bilaterally\par Gait: steady

## 2020-03-02 ENCOUNTER — APPOINTMENT (OUTPATIENT)
Dept: NEUROLOGY | Facility: CLINIC | Age: 70
End: 2020-03-02

## 2020-06-05 ENCOUNTER — EMERGENCY (EMERGENCY)
Facility: HOSPITAL | Age: 70
LOS: 1 days | Discharge: HOME | End: 2020-06-05
Attending: EMERGENCY MEDICINE | Admitting: EMERGENCY MEDICINE
Payer: MEDICARE

## 2020-06-05 VITALS
DIASTOLIC BLOOD PRESSURE: 80 MMHG | OXYGEN SATURATION: 95 % | SYSTOLIC BLOOD PRESSURE: 139 MMHG | RESPIRATION RATE: 18 BRPM | TEMPERATURE: 98 F | HEART RATE: 62 BPM

## 2020-06-05 VITALS
RESPIRATION RATE: 18 BRPM | DIASTOLIC BLOOD PRESSURE: 92 MMHG | SYSTOLIC BLOOD PRESSURE: 146 MMHG | HEIGHT: 64 IN | WEIGHT: 192.02 LBS | HEART RATE: 101 BPM | OXYGEN SATURATION: 96 % | TEMPERATURE: 99 F

## 2020-06-05 PROCEDURE — 99283 EMERGENCY DEPT VISIT LOW MDM: CPT

## 2020-06-05 RX ORDER — IBUPROFEN 200 MG
400 TABLET ORAL ONCE
Refills: 0 | Status: COMPLETED | OUTPATIENT
Start: 2020-06-05 | End: 2020-06-05

## 2020-06-05 RX ORDER — LIDOCAINE 4 G/100G
1 CREAM TOPICAL ONCE
Refills: 0 | Status: COMPLETED | OUTPATIENT
Start: 2020-06-05 | End: 2020-06-05

## 2020-06-05 RX ORDER — LIDOCAINE 4 G/100G
1 CREAM TOPICAL
Qty: 7 | Refills: 0
Start: 2020-06-05 | End: 2020-06-11

## 2020-06-05 RX ADMIN — Medication 400 MILLIGRAM(S): at 08:39

## 2020-06-05 RX ADMIN — LIDOCAINE 1 PATCH: 4 CREAM TOPICAL at 08:39

## 2020-06-05 NOTE — ED PROVIDER NOTE - OBJECTIVE STATEMENT
The pt is a 71 y/o M, who presents to ED c/o lbp x 11 yrs. Pt states that was told to take tyl, but want something "stronger", no acute changes in any symptoms today. Pain to lower back, 7/10, constant and dull, aggravated w/certain mov and positions, non radiating. Denies fall, trauma, numbness or tingling to toes, loss of bowel or bladder control, fevers, chills, cp, sob, dysuria

## 2020-06-05 NOTE — ED PROVIDER NOTE - PATIENT PORTAL LINK FT
You can access the FollowMyHealth Patient Portal offered by [facility] by registering at the following website: [email or web address]. By joining [employer]’s FollowMyHealth portal, you will also be able to view your health information using other applications (apps) compatible with our system.

## 2020-06-05 NOTE — ED PROVIDER NOTE - NEUROLOGICAL, MLM
Metabolic encephalopathy Alert and oriented, no focal deficits, no motor or sensory deficits. Hypoxia

## 2020-06-05 NOTE — ED ADULT NURSE NOTE - NSHOSCREENINGQ1_ED_ALL_ED
Patient's daughter called to discuss dental work to include extracting 4-5 teeth and replacing most likely dentures. Instructed daughter to ask what the dentist will require prior to teeth extractions and the fax number and will discuss with Dr Aguiar for his recommendations.  Answered all patient's daughter's questions and verbalized understanding. Cortney Ramos RN, BSN.     No

## 2020-06-05 NOTE — ED PROVIDER NOTE - ATTENDING CONTRIBUTION TO CARE
71yo M here w/ LBP x 11 yrs, on tylenol, here req stronger meds. no acute changes in his pain. no trauma, no numbness/tingling, no loss of bowel/bladder control. VSS, no rash, no spinal tenderness, ambulatory without issue, strength/sensation intact in all extremities. needs to f/u pcp/spine for his chronic LBP.

## 2020-06-05 NOTE — ED ADULT NURSE NOTE - INTERVENTIONS DEFINITIONS
Stretcher in lowest position, wheels locked, appropriate side rails in place/Monitor gait and stability/Provide visual cue, wrist band, yellow gown, etc./Physically safe environment: no spills, clutter or unnecessary equipment/Instruct patient to call for assistance/Monitor for mental status changes and reorient to person, place, and time

## 2020-06-05 NOTE — ED ADULT NURSE NOTE - OBJECTIVE STATEMENT
Patient is a 71yo male reporting right lower back pain s/p fall on 5/25. Patient ambulating with steady gait, denies other injuries.

## 2020-06-05 NOTE — ED ADULT TRIAGE NOTE - CHIEF COMPLAINT QUOTE
low back pain for over a week, seen by PMD last week was given Tylenol with no relief; denies fall /injury

## 2020-06-05 NOTE — ED PROVIDER NOTE - MUSCULOSKELETAL, MLM
no rash, no discolorations, no spinal tend, FROM, para lumbar tend b/l, LE w/5/5 muscle strength b/l, good resistance b/l, FROM b/l, normal gait

## 2020-06-05 NOTE — ED PROVIDER NOTE - NSFOLLOWUPINSTRUCTIONS_ED_ALL_ED_FT
follow up with your primary doctor or spine specialist    Back Pain    Back pain is very common in adults. The cause of back pain is rarely dangerous and the pain often gets better over time. The cause of your back pain may not be known and may include strain of muscles or ligaments, degeneration of the spinal disks, or arthritis. Occasionally the pain may radiate down your leg(s). Over-the-counter medicines to reduce pain and inflammation are often the most helpful. Stretching and remaining active frequently helps the healing process.     SEEK IMMEDIATE MEDICAL CARE IF YOU HAVE ANY OF THE FOLLOWING SYMPTOMS: bowel or bladder control problems, unusual weakness or numbness in your arms or legs, nausea or vomiting, abdominal pain, fever, dizziness/lightheadedness.

## 2020-06-05 NOTE — ED PROVIDER NOTE - CLINICAL SUMMARY MEDICAL DECISION MAKING FREE TEXT BOX
pt presents to ED c/o chronic lbp , no acute changes in any symptoms, has been told to take tyl but seeking "stronger" rx, no trauma / fall, no bony tend on exam to suggest fx, no rash to suggest zoster, FROM w/normal gait, no signs of cord compression or cauda equina, no emergent indication for any imaging at this time, will tx w/lidoderm patch and f/u w/pmd or spine, strict return precautions discussed, pt understands and agrees w/plan

## 2020-06-05 NOTE — ED PROVIDER NOTE - CARE PROVIDER_API CALL
Gaudencio Aranda  ORTHOPAEDIC SURGERY  [address] [place] [address]  Phone: [phone]  Fax: [phone]  Follow Up Time:

## 2020-06-09 DIAGNOSIS — M54.5 LOW BACK PAIN: ICD-10-CM

## 2020-12-09 NOTE — ED ADULT NURSE NOTE - NS ED NOTE ABUSE SUSPICION NEGLECT YN
Vin Branham is a 32 year old  Women who presents today requesting placement of a Kyleena iud.  She has had the Mirena iud for 12 years.  This IUD has been in place for almost 7 years.  She denies the possibility of pregnancy.      She is still considering tubal ligation.  She understands the IUD is more than 99% effective, but she really does not want to get pregnant in the future.  She has had some family members that have got pregnant with the IUD.      She also feels like she would do better with a IUD that has less hormone in it.  She feels better as the Mirena expires.  Patient continues to have irregular bleeding with the Mirena IUD.  She has the menses twice per month, each lasting 4 days.  She has moderate bleeding and uses a regular tampon 3 times daily.  The patient has always had a menstrual cycle with the IUD which was initially placed in 2008 after the birth of her last child.    After discussion, the patient elects to proceed with the Kyleena IUD.    We discussed risks, benefits, and alternatives including but not limited to:     Possibility of pregnancy and ectopic pregnancy.    Possibility of pelvic inflammatory disease, particularly in the first 20 days and with new partners.    Risk of uterine perforation or IUD expulsion.    Possibility of difficult removal.    Spotting or heavy bleeding.    Cramping, pain or infection during or after insertion.    The patient was given patient information on the IUD and the patient education brochure from the .  She understands the main indication for removal is abnormal bleeding.  The patient has given consent to proceed with placement of the IUD.  She wishes to proceed.  All questions answered.    PROCEDURE:     Type of IUD: Kyleena   She is placed in a dorsal lithotomy potion and a pelvic exam is performed to determine the position of the uterus.  The cervix is identified and the strings grasped with the ring forceps.  It was cleansed with  betadine.  An allis is applied to the anterior lip of the cervix for stabilization.   The uterus sounded to 7.0 cm. (Target sound depth is 6.5 cm to 8.5 cm.)  The IUD is inserted into the uterus under sterile conditions in the usual fashion.    Lot number [id number]  and expiration date Feb 2022.  NDC#  51476-131-04 The IUD string is then cut to 3.5 cm.    The patient tolerated this procedure without immediate complication.  The patient is to return or call immediately for any unexplained fever, abdominal or pelvic pain, excessive bleeding, possibility of pregnancy, foul-smelling discharge, sense that the IUD has been expelled.  All questions were answered.  Patient is aware that the IUD will be effective for 5 years and then will need removal or replacement.  Barrier methods for the first week advised.      She will may a virtual visit to discuss tubal ligation.  She will need the tubal consent form sent to her.     Sariah Lazo MD     No

## 2021-10-06 PROBLEM — I10 ESSENTIAL HYPERTENSION: Status: ACTIVE | Noted: 2018-07-20

## 2022-06-17 ENCOUNTER — APPOINTMENT (OUTPATIENT)
Dept: HEART AND VASCULAR | Facility: CLINIC | Age: 72
End: 2022-06-17
Payer: MEDICARE

## 2022-06-17 VITALS
SYSTOLIC BLOOD PRESSURE: 135 MMHG | TEMPERATURE: 97.2 F | DIASTOLIC BLOOD PRESSURE: 67 MMHG | HEART RATE: 82 BPM | OXYGEN SATURATION: 97 % | HEIGHT: 61 IN | BODY MASS INDEX: 28.13 KG/M2 | WEIGHT: 148.99 LBS

## 2022-06-17 DIAGNOSIS — Z87.891 PERSONAL HISTORY OF NICOTINE DEPENDENCE: ICD-10-CM

## 2022-06-17 PROCEDURE — 99203 OFFICE O/P NEW LOW 30 MIN: CPT

## 2022-06-17 PROCEDURE — 93000 ELECTROCARDIOGRAM COMPLETE: CPT

## 2022-06-17 RX ORDER — ATORVASTATIN CALCIUM 40 MG/1
40 TABLET, FILM COATED ORAL
Qty: 1 | Refills: 3 | Status: DISCONTINUED | COMMUNITY
Start: 1900-01-01 | End: 2022-06-17

## 2022-06-17 RX ORDER — METFORMIN HYDROCHLORIDE 500 MG/1
500 TABLET, COATED ORAL
Qty: 360 | Refills: 0 | Status: ACTIVE | COMMUNITY
Start: 2021-07-28

## 2022-06-17 RX ORDER — AMLODIPINE BESYLATE 10 MG/1
10 TABLET ORAL
Qty: 90 | Refills: 0 | Status: ACTIVE | COMMUNITY
Start: 2022-02-01

## 2022-06-17 NOTE — HISTORY OF PRESENT ILLNESS
[FreeTextEntry1] : 72 M TIA HTN DM Speech disturbance Former smoker 15 years \par \par \par brought in by wife here to self to establish care told at the time he had a TIA  he had "small MI' as well. 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

## 2022-06-17 NOTE — ASSESSMENT
[FreeTextEntry1] : - ECG with inferior infarct will do echo and stress test\par - TIA Increase atorvastatin to 40 mg daily\par - fu after testing \par

## 2022-07-25 ENCOUNTER — APPOINTMENT (OUTPATIENT)
Dept: HEART AND VASCULAR | Facility: CLINIC | Age: 72
End: 2022-07-25

## 2022-07-25 PROCEDURE — 93306 TTE W/DOPPLER COMPLETE: CPT

## 2022-08-02 ENCOUNTER — APPOINTMENT (OUTPATIENT)
Dept: HEART AND VASCULAR | Facility: CLINIC | Age: 72
End: 2022-08-02

## 2022-08-02 VITALS — WEIGHT: 148.99 LBS | HEIGHT: 61 IN | BODY MASS INDEX: 28.13 KG/M2

## 2022-08-02 PROCEDURE — A9500: CPT

## 2022-08-02 PROCEDURE — 78452 HT MUSCLE IMAGE SPECT MULT: CPT

## 2022-08-02 PROCEDURE — 93015 CV STRESS TEST SUPVJ I&R: CPT

## 2022-08-05 ENCOUNTER — APPOINTMENT (OUTPATIENT)
Dept: HEART AND VASCULAR | Facility: CLINIC | Age: 72
End: 2022-08-05

## 2022-08-05 VITALS
BODY MASS INDEX: 28.7 KG/M2 | DIASTOLIC BLOOD PRESSURE: 68 MMHG | HEART RATE: 75 BPM | WEIGHT: 151.99 LBS | SYSTOLIC BLOOD PRESSURE: 146 MMHG | HEIGHT: 61 IN | OXYGEN SATURATION: 98 % | TEMPERATURE: 96.8 F

## 2022-08-05 DIAGNOSIS — I21.19 ST ELEVATION (STEMI) MYOCARDIAL INFARCTION INVOLVING OTHER CORONARY ARTERY OF INFERIOR WALL: ICD-10-CM

## 2022-08-05 PROCEDURE — 99214 OFFICE O/P EST MOD 30 MIN: CPT

## 2022-08-05 NOTE — ASSESSMENT
[FreeTextEntry1] : - ECG with inferior infarct stress test and echo are normal \par - TIA Increase atorvastatin to 40 mg daily\par - fu in six months \par

## 2022-08-05 NOTE — HISTORY OF PRESENT ILLNESS
[FreeTextEntry1] : 72 M TIA HTN DM Speech disturbance Former smoker 15 years \par \par \par brought in by wife here to self to establish care told at the time he had a TIA  he had "small MI' as well. 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

## 2022-08-19 ENCOUNTER — APPOINTMENT (OUTPATIENT)
Dept: NEUROLOGY | Facility: CLINIC | Age: 72
End: 2022-08-19

## 2022-08-19 VITALS
TEMPERATURE: 98.4 F | DIASTOLIC BLOOD PRESSURE: 72 MMHG | WEIGHT: 151 LBS | BODY MASS INDEX: 28.51 KG/M2 | HEIGHT: 61 IN | OXYGEN SATURATION: 99 % | SYSTOLIC BLOOD PRESSURE: 131 MMHG | HEART RATE: 68 BPM

## 2022-08-19 PROCEDURE — 99213 OFFICE O/P EST LOW 20 MIN: CPT

## 2022-08-19 NOTE — PHYSICAL EXAM
[FreeTextEntry1] : General: sitting on exam table comfortably, NAD\par Eyes: anicteric sclera\par CV: RRR	\par Extremities: FROM x4, 2+ radial pulses bilaterally\par 			\par Neurological exam:	\par Mental status: AA&O x 3, fluent - able name 5/5 words, able read words, spontaneous speech, no dysarthria, follows 2-step commands across midline, able give full history of present and past events, memory intact, normal attention span\par Cranial nerves: EOMI, PERRL+, VFF, no nystagmus, facial sensation intact, no facial droop, tongue midline\par Motor: No pronator drift, 5/5 x4, normal tone and bulk\par Sensory: Intact light touch, temperature bilaterally\par Cerebellar: FNF and HKS intact bilaterally\par Gait: steady

## 2022-08-19 NOTE — REASON FOR VISIT
[Follow-Up: _____] : a [unfilled] follow-up visit [Time Spent: ____ minutes] : Total time spent using  services: [unfilled] minutes. The patient's primary language is not English thus required  services. [Interpreters_IDNumber] : [id number] [Interpreters_FullName] : Gama

## 2022-08-19 NOTE — HISTORY OF PRESENT ILLNESS
[FreeTextEntry1] : Pt is a 72 year-old male presents with his wife for follow up s/p CVA with residual aphasia.  He hasn't had any more episodes of double vision.  No specific weakness or numbness.  No new neurological symptoms and he is overall doing well. Memory is better. Per wife he still has difficulty expressing himself and getting the words out which makes him frustrated at time. He has not done any ST since before Covid. \par \par Did BW with PCP 4 days ago, pending those results. PCP is Dr. Hanley \par \par He's compliant with his medications.  \par He's on - \par - Aspirin 81mg.  No bleeding in urine or stool.  \par - Atorvastatin 40mg - No muscle pains or cramps.\par \par Stroke risk factors - Compliant with his medications.\par HTN - well-controlled, 131/72 in office today \par DM - He measures finger sticks - 120s. Unsure last A1c, but was checked this week with PCP. \par \par He exercises daily with walks. Less so when it is very hot outsdie.

## 2022-08-19 NOTE — REVIEW OF SYSTEMS
[As Noted in HPI] : as noted in HPI [Incontinence] : incontinence [Negative] : Heme/Lymph [FreeTextEntry6] : Minimal snoring at night.

## 2022-08-19 NOTE — ASSESSMENT
[FreeTextEntry1] : 72 year-old male with multiple stroke risk factors presents for follow up for CVA with resid aphasia. \par \par Plan:\par 1) Secondary stroke prevention - \par - Continue Aspirin 81mg for single antiplatelet.  Recommended that he take Aspirin with food..\par - Continue Atorvastatin 20mg for statin.  LDL goal is less than 70.  Would appreciate recent lipid profile.\par \par 2) Stroke risk factors - \par a) HTN - deferred to his PMD and cardiologist.  Goal is less than 130/80.  \par b) DM - deferred to his PMD.  Would appreciate recent hemoglobin A1C.  \par \par 3) Referred him to Speech therapy\par \par 4) Carotid US per routine \par \par Counselled on BEFAST/ signs of stroke, and advised to present to ER immediately if any

## 2022-09-06 ENCOUNTER — APPOINTMENT (OUTPATIENT)
Dept: NEUROLOGY | Facility: CLINIC | Age: 72
End: 2022-09-06

## 2022-09-06 PROCEDURE — 93880 EXTRACRANIAL BILAT STUDY: CPT

## 2023-01-25 ENCOUNTER — APPOINTMENT (OUTPATIENT)
Dept: HEART AND VASCULAR | Facility: CLINIC | Age: 73
End: 2023-01-25
Payer: MEDICARE

## 2023-01-25 ENCOUNTER — NON-APPOINTMENT (OUTPATIENT)
Age: 73
End: 2023-01-25

## 2023-01-25 VITALS
TEMPERATURE: 99.2 F | BODY MASS INDEX: 27.38 KG/M2 | HEART RATE: 64 BPM | SYSTOLIC BLOOD PRESSURE: 130 MMHG | DIASTOLIC BLOOD PRESSURE: 60 MMHG | HEIGHT: 61 IN | OXYGEN SATURATION: 97 % | WEIGHT: 145 LBS

## 2023-01-25 PROCEDURE — 99214 OFFICE O/P EST MOD 30 MIN: CPT | Mod: 25

## 2023-01-25 PROCEDURE — 93000 ELECTROCARDIOGRAM COMPLETE: CPT

## 2023-01-29 NOTE — HISTORY OF PRESENT ILLNESS
[FreeTextEntry1] : 72 M TIA HTN DM Speech disturbance Former smoker 15 years \par \par \par brought in by wife here to self to establish care told at the time he had a TIA  he had "small MI' as well.  his cardiac testing was unrevealing he feels well was told he had a lung mass and is unhappy with his primary care \par \par \par ecg sr 1/25/2023\par stress test 8/2022 Normal perfusion\par echo 7/2022 EF normal\par \par wife translating

## 2023-01-29 NOTE — ASSESSMENT
[FreeTextEntry1] : - TIA on atorvastatin to 40 mg daily and asa 81 mg daily\par -DM on jardiance and metformin\par -check urine microalbuminn \par pcp numbers given\par - fu in six months \par

## 2023-01-30 ENCOUNTER — APPOINTMENT (OUTPATIENT)
Dept: INTERNAL MEDICINE | Facility: CLINIC | Age: 73
End: 2023-01-30

## 2023-03-02 ENCOUNTER — APPOINTMENT (OUTPATIENT)
Dept: INTERNAL MEDICINE | Facility: CLINIC | Age: 73
End: 2023-03-02

## 2023-03-13 ENCOUNTER — EMERGENCY (EMERGENCY)
Facility: HOSPITAL | Age: 73
LOS: 1 days | Discharge: HOME | End: 2023-03-13
Attending: EMERGENCY MEDICINE | Admitting: EMERGENCY MEDICINE
Payer: MEDICARE

## 2023-03-13 VITALS
WEIGHT: 147.93 LBS | RESPIRATION RATE: 17 BRPM | TEMPERATURE: 99 F | OXYGEN SATURATION: 96 % | HEART RATE: 106 BPM | DIASTOLIC BLOOD PRESSURE: 80 MMHG | SYSTOLIC BLOOD PRESSURE: 157 MMHG | HEIGHT: 63 IN

## 2023-03-13 VITALS
TEMPERATURE: 98 F | OXYGEN SATURATION: 98 % | RESPIRATION RATE: 16 BRPM | DIASTOLIC BLOOD PRESSURE: 68 MMHG | HEART RATE: 89 BPM | SYSTOLIC BLOOD PRESSURE: 134 MMHG
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
## 2023-03-13 PROCEDURE — 72125 CT NECK SPINE W/O DYE: CPT | Mod: 26,MA

## 2023-03-13 PROCEDURE — 73060 X-RAY EXAM OF HUMERUS: CPT

## 2023-03-13 PROCEDURE — 70450 CT HEAD/BRAIN W/O DYE: CPT | Mod: MA

## 2023-03-13 PROCEDURE — 73030 X-RAY EXAM OF SHOULDER: CPT

## 2023-03-13 PROCEDURE — 93005 ELECTROCARDIOGRAM TRACING: CPT

## 2023-03-13 PROCEDURE — 99284 EMERGENCY DEPT VISIT MOD MDM: CPT

## 2023-03-13 PROCEDURE — 73080 X-RAY EXAM OF ELBOW: CPT

## 2023-03-13 PROCEDURE — 73080 X-RAY EXAM OF ELBOW: CPT | Mod: 26,LT

## 2023-03-13 PROCEDURE — 71045 X-RAY EXAM CHEST 1 VIEW: CPT

## 2023-03-13 PROCEDURE — 73030 X-RAY EXAM OF SHOULDER: CPT | Mod: 26,50

## 2023-03-13 PROCEDURE — 70450 CT HEAD/BRAIN W/O DYE: CPT | Mod: 26,MA

## 2023-03-13 PROCEDURE — 72125 CT NECK SPINE W/O DYE: CPT | Mod: MA

## 2023-03-13 PROCEDURE — 71045 X-RAY EXAM CHEST 1 VIEW: CPT | Mod: 26

## 2023-03-13 PROCEDURE — 73060 X-RAY EXAM OF HUMERUS: CPT | Mod: 26,LT

## 2023-03-13 PROCEDURE — 99284 EMERGENCY DEPT VISIT MOD MDM: CPT | Mod: 25

## 2023-03-13 RX ORDER — IBUPROFEN 200 MG
600 TABLET ORAL ONCE
Refills: 0 | Status: COMPLETED | OUTPATIENT
Start: 2023-03-13 | End: 2023-03-13

## 2023-03-13 RX ADMIN — Medication 600 MILLIGRAM(S): at 12:53

## 2023-03-13 NOTE — ED PROVIDER NOTE - OBJECTIVE STATEMENT
73 y/o M with PMHx prior CVA with aphasia and difficulty verbally communicating (on Plavix), DM, and HTN presents to ED s/p fall 3 months ago onto left arm with persistent left upper arm and left elbow pain. Pt was not evaluated at that time. Wife denies head trauma, though pt with difficulty communication so unclear if accurate. Denies chest pain and SOB. Pt had recent cardiology work up as per wife which was normal.

## 2023-03-13 NOTE — ED PROVIDER NOTE - PATIENT PORTAL LINK FT
You can access the FollowMyHealth Patient Portal offered by [facility] by registering at the following website: [email or web address]. By joining [employer]’s FollowMyHealth portal, you will also be able to view your health information using other applications (apps) compatible with our system.

## 2023-03-13 NOTE — ED ADULT NURSE NOTE - OBJECTIVE STATEMENT
72 y.o. Male c/o L shoulder pain. "I had pain since I fell a couple months ago." Denies numbness/tingling. PMHx stroke with expressive aphasia and shuffling gait.

## 2023-03-13 NOTE — ED ADULT NURSE NOTE - NSSUHOSCREENINGYN_ED_ALL_ED
Axel Morton  [facility]  [address]   [facility] [address]      Dear Axel      We are writing to inform you that you are due for your annual Medicare Wellness Exam.    This appointment is with one of our Registered Nurses and will focus on preventative care including a Personalized Prevention Plan, a check of your weight, blood pressure and pulse, a brief check of your hearing and vision, any immunizations you may need, as well as referrals for any other screening services or test you may need.    Medicare will cover the cost of the preventative care services. If you have any other health concerns or questions to discuss with your provider, a medical visit may be added to your wellness visit and you may need to pay a deductible or co-pay depending on your Medicare plan.  You may also add your Medicare Wellness Exam to an existing appointment with your me.    Please call our office at [phone] to schedule your Medicare Wellness Exam with a Registered Nurse.    We look forward to seeing you soon.    Sincerely,      Winifred Godinez MD  [facility]  [address] [place] [id number] [phone]     Yes - the patient is able to be screened

## 2023-03-13 NOTE — ED PROVIDER NOTE - CLINICAL SUMMARY MEDICAL DECISION MAKING FREE TEXT BOX
73 y/o M with fall 3 months ago c/o persistent left upper arm and elbow pain. Will get XRs to evaluate for trauma, screening EKG, and CT head/neck to r/o spinal fx and ICH. Dispo pending work up and reevaluation.

## 2023-03-13 NOTE — ED ADULT TRIAGE NOTE - CHIEF COMPLAINT QUOTE
pt brought in by wife, c/o chronic but worsening left shoulder and arm pain. as per wife ' pt had a fall a few months ago and since then his shoulder hurts" pmh CVA with aphasia.

## 2023-03-13 NOTE — ED ADULT NURSE NOTE - NSIMPLEMENTINTERV_GEN_ALL_ED
Implemented All Fall Risk Interventions:  [place] to call system. Call bell, personal items and telephone within reach. Instruct patient to call for assistance. Room bathroom lighting operational. Non-slip footwear when patient is off stretcher. Physically safe environment: no spills, clutter or unnecessary equipment. Stretcher in lowest position, wheels locked, appropriate side rails in place. Provide visual cue, wrist band, yellow gown, etc. Monitor gait and stability. Monitor for mental status changes and reorient to person, place, and time. Review medications for side effects contributing to fall risk. Reinforce activity limits and safety measures with patient and family.

## 2023-03-13 NOTE — ED PROVIDER NOTE - PHYSICAL EXAMINATION
VITAL SIGNS: I have reviewed nursing notes and confirm.  CONSTITUTIONAL: Well-developed; in no acute distress.  SKIN: Agree with RN documentation regarding decubitus evaluation. Remainder of skin exam is warm and dry, no acute rash.  HEAD: Normocephalic; atraumatic.  EYES: PERRL, EOM intact; conjunctiva and sclera clear.  ENT: No nasal discharge; airway clear.  NECK: Supple; non tender.  CARD: S1, S2 normal; no murmurs, gallops, or rubs. Regular rate and rhythm.  RESP: No wheezes, rales or rhonchi.  ABD: Normal bowel sounds; soft; non-distended; non-tender; no hepatosplenomegaly.  EXT: Normal ROM. No clubbing, cyanosis or edema. +tenderness at left distal humerus and diffuse tenderness at left elbow. No spinal tenderness.   LYMPH: No acute cervical adenopathy.  NEURO: Alert, oriented. Grossly unremarkable. No focal deficits but aphasic.  PSYCH: Cooperative, appropriate.

## 2023-03-14 DIAGNOSIS — Z79.02 LONG TERM (CURRENT) USE OF ANTITHROMBOTICS/ANTIPLATELETS: ICD-10-CM

## 2023-03-14 DIAGNOSIS — Z79.82 LONG TERM (CURRENT) USE OF ASPIRIN: ICD-10-CM

## 2023-03-14 DIAGNOSIS — I69.920 APHASIA FOLLOWING UNSPECIFIED CEREBROVASCULAR DISEASE: ICD-10-CM

## 2023-03-14 DIAGNOSIS — E11.9 TYPE 2 DIABETES MELLITUS WITHOUT COMPLICATIONS: ICD-10-CM

## 2023-03-14 DIAGNOSIS — M79.622 PAIN IN LEFT UPPER ARM: ICD-10-CM

## 2023-03-14 DIAGNOSIS — M54.12 RADICULOPATHY, CERVICAL REGION: ICD-10-CM

## 2023-03-14 DIAGNOSIS — I10 ESSENTIAL (PRIMARY) HYPERTENSION: ICD-10-CM

## 2023-03-14 DIAGNOSIS — Z79.84 LONG TERM (CURRENT) USE OF ORAL HYPOGLYCEMIC DRUGS: ICD-10-CM

## 2023-04-06 NOTE — ED SUB INTERN NOTE - LOCATION
General Sunscreen Counseling: Discussed moisturizer with sunscreen should be applied to exposed areas daily, each am,  For any expected exposure a heavier sunscreen should be applied to all exposed areas and be reapplied every two hours while exposed . The sunscreen should be broad spectrum spf 30-50 , UVA/UVB and contain Zinc and Titanium Dioxide. Recommend Elta MD products. Recommend sunprotective clothing . Such as long sleeve UPF protective shirts, wide brim hats, neck covers and sunglasses. Sunscreens should be applied at least 15 minutes prior to expected sun exposure and then every 2 hours after that as long as sun exposure continues. If swimming or exercising sunscreen should be reapplied every 45 minutes to an hour after getting wet or sweating. I also recommended a  mineral based lip balm with zinc as well. Chemical lip balms should be avoided. Sun protective clothing can be used in lieu of sunscreen but must be worn the entire time you are exposed to the sun's rays. Products Recommended: Daily moisturizer with spf. Elta Md lotion , Elta MD daily, Elta MD clear, Cerave AM\\n\\nFor expected Exposure: Elta Pure, Elta Active.  Sun Bum Mineral lip balm Detail Level: Zone shoulder/tenderness over right shoulder and anterior humerus/arm

## 2023-04-06 NOTE — ED ADULT NURSE NOTE - NURSING ED SKIN COLOR

## 2023-08-15 ENCOUNTER — APPOINTMENT (OUTPATIENT)
Dept: HEART AND VASCULAR | Facility: CLINIC | Age: 73
End: 2023-08-15

## 2023-08-18 ENCOUNTER — APPOINTMENT (OUTPATIENT)
Dept: NEUROLOGY | Facility: CLINIC | Age: 73
End: 2023-08-18

## 2023-10-05 NOTE — OCCUPATIONAL THERAPY INITIAL EVALUATION ADULT - GENERAL OBSERVATIONS, REHAB EVAL
29.6 Right hand dominant. Chart reviewed, patient cleared for OT eval by LAVELLE Randolph. Received semi-supine, NAD, +tele, +heplock, denies pain.

## 2024-01-12 PROBLEM — I63.9 CEREBRAL INFARCTION, UNSPECIFIED: Chronic | Status: ACTIVE | Noted: 2023-03-13

## 2024-01-25 ENCOUNTER — APPOINTMENT (OUTPATIENT)
Dept: NEUROLOGY | Facility: CLINIC | Age: 74
End: 2024-01-25
Payer: MEDICARE

## 2024-01-25 VITALS
BODY MASS INDEX: 26.43 KG/M2 | SYSTOLIC BLOOD PRESSURE: 146 MMHG | HEIGHT: 61 IN | DIASTOLIC BLOOD PRESSURE: 63 MMHG | WEIGHT: 140 LBS | HEART RATE: 69 BPM | OXYGEN SATURATION: 99 % | TEMPERATURE: 98.4 F

## 2024-01-25 DIAGNOSIS — R13.10 DYSPHAGIA, UNSPECIFIED: ICD-10-CM

## 2024-01-25 DIAGNOSIS — G45.9 TRANSIENT CEREBRAL ISCHEMIC ATTACK, UNSPECIFIED: ICD-10-CM

## 2024-01-25 DIAGNOSIS — G47.9 SLEEP DISORDER, UNSPECIFIED: ICD-10-CM

## 2024-01-25 PROCEDURE — 99215 OFFICE O/P EST HI 40 MIN: CPT

## 2024-01-25 RX ORDER — ATORVASTATIN CALCIUM 80 MG/1
80 TABLET, FILM COATED ORAL DAILY
Refills: 0 | Status: ACTIVE | COMMUNITY

## 2024-01-25 RX ORDER — CYCLOSPORINE 0.5 MG/ML
0.05 EMULSION OPHTHALMIC
Qty: 60 | Refills: 0 | Status: DISCONTINUED | COMMUNITY
Start: 2022-05-16 | End: 2024-01-25

## 2024-01-25 RX ORDER — LOSARTAN POTASSIUM 50 MG/1
50 TABLET, FILM COATED ORAL DAILY
Refills: 0 | Status: ACTIVE | COMMUNITY

## 2024-01-25 RX ORDER — EMPAGLIFLOZIN 25 MG/1
25 TABLET, FILM COATED ORAL
Qty: 90 | Refills: 0 | Status: DISCONTINUED | COMMUNITY
Start: 2021-11-30 | End: 2024-01-25

## 2024-01-25 RX ORDER — MULTIVIT WITH MIN/MFOLATE/K2 340-15/3 G
3 POWDER (GRAM) ORAL
Qty: 30 | Refills: 3 | Status: ACTIVE | COMMUNITY
Start: 2024-01-25 | End: 1900-01-01

## 2024-01-25 RX ORDER — ATORVASTATIN CALCIUM 40 MG/1
40 TABLET, FILM COATED ORAL
Qty: 1 | Refills: 3 | Status: DISCONTINUED | COMMUNITY
Start: 2022-02-01 | End: 2024-01-25

## 2024-01-25 RX ORDER — ORAL SEMAGLUTIDE 14 MG/1
14 TABLET ORAL
Qty: 90 | Refills: 0 | Status: DISCONTINUED | COMMUNITY
Start: 2022-05-14 | End: 2024-01-25

## 2024-01-25 NOTE — ASSESSMENT
[FreeTextEntry1] : Chace Pichardo is a 73-year-old male with PMH of TIA, HTN, DM, CVA and former smoker presents for yearly follow up.  Plan: -Continue Aspirin and Atorvastatin, LDL goal <70  -Continue aggressive vascular risk factor control with PCP -Complete overdue yearly carotid US and TCDs -GI referral for concern of dysphagia and silent aspiration -PT and SLP referral  -Try Melatonin at bedtime to assist with sleep; reach out to PCP for other prescription medication options -Counselled on healthy eating (DASH/Mediterranean diet, limiting red meats and fried foods) -Counselled on importance of regular exercise and remaining active -Counselled on f/u with PCP regarding regular health maintenance and prevention, including routine screening -Counselled on signs of stroke BEFAST and to call 911 with any new or worsening neurological symptoms -RTO in 6 months

## 2024-01-25 NOTE — HISTORY OF PRESENT ILLNESS
[FreeTextEntry1] : Chace Pichardo is a 73-year-old male with PMH of TIA, HTN, DM, CVA and former smoker presents for yearly follow up. He was last seen by JAYLEN Aguilar in 8/2022.  used throughout the visit.   Since last visit, patient reports no new stroke-like symptoms. His wife notes that his speech has improved a little. He is tolerating his Aspirin 81 mg and Atorvastatin 80 mg without bleeding, bruising or myalgias. HCT from 3/2023 confirmed chronic lacunar infarct in the L CR/BG and atherosclerotic calcifications of the cavernous and supraclinoid internal carotid arteries bilaterally and bilateral intradural vertebral arteries with no acute intracranial findings. /83. He eats a lot of fruits and vegetables with no regular exercise. He does snore occasionally but his denies stopping breathing and gasping for air. He recently got lab work done with his PCP. He is no longer doing speech therapy since he wasn't interested in it. No recent PT/OT either. He does not sleep well at night. His wife is also concerned that he coughs when he tries to eat his food and then his face turns red. He also has a chronic cough that is being managed by his PCP.

## 2024-01-25 NOTE — PHYSICAL EXAM
[FreeTextEntry1] : Alert. Hypotonia with mild dysathria and aphasia. Cranial nerves II-XII are intact. No nystagmus. Motor exam reveals intact strength with individual muscle testing in bilateral upper and lower extremities. No drift. Finger-to-nose is intact. Rapid alternating movements are normal in the upper and lower extremities. In wheelchair.

## 2024-02-12 ENCOUNTER — APPOINTMENT (OUTPATIENT)
Dept: NEUROLOGY | Facility: CLINIC | Age: 74
End: 2024-02-12
Payer: MEDICARE

## 2024-02-12 PROCEDURE — 93886 INTRACRANIAL COMPLETE STUDY: CPT

## 2024-02-12 PROCEDURE — 93880 EXTRACRANIAL BILAT STUDY: CPT

## 2024-02-20 NOTE — PATIENT PROFILE ADULT. - MEDICATION ADMINISTRATION INFO, PROFILE
Pt returned call to [facility] Leon-told pt the nurse had lvm for pt with instructions from SANDRA to take claritan OTC-pt understood  
no concerns

## 2024-02-23 ENCOUNTER — NON-APPOINTMENT (OUTPATIENT)
Age: 74
End: 2024-02-23

## 2024-03-14 ENCOUNTER — NON-APPOINTMENT (OUTPATIENT)
Age: 74
End: 2024-03-14

## 2024-03-20 ENCOUNTER — APPOINTMENT (OUTPATIENT)
Dept: GASTROENTEROLOGY | Facility: CLINIC | Age: 74
End: 2024-03-20

## 2024-04-16 ENCOUNTER — APPOINTMENT (OUTPATIENT)
Dept: GASTROENTEROLOGY | Facility: CLINIC | Age: 74
End: 2024-04-16

## 2024-05-15 DIAGNOSIS — R26.81 UNSTEADINESS ON FEET: ICD-10-CM

## 2024-05-15 DIAGNOSIS — I63.81 OTHER CEREBRAL INFARCTION DUE TO OCCLUSION OR STENOSIS OF SMALL ARTERY: ICD-10-CM

## 2024-10-07 DIAGNOSIS — R26.81 UNSTEADINESS ON FEET: ICD-10-CM

## 2024-10-07 DIAGNOSIS — I63.81 OTHER CEREBRAL INFARCTION DUE TO OCCLUSION OR STENOSIS OF SMALL ARTERY: ICD-10-CM

## 2025-06-18 NOTE — ED ADULT NURSE NOTE - NSHISCREENINGQ1_ED_A_ED
Medical Necessity Clause: This procedure was medically necessary because the lesions that were treated were: Show Spray Paint Technique Variable?: Yes Render Post-Care Instructions In Note?: no Post-Care Instructions: I reviewed with the patient in detail post-care instructions. Patient is to wear sunprotection, and avoid picking at any of the treated lesions. Pt may apply Vaseline to crusted or scabbing areas. Spray Paint Text: The liquid nitrogen was applied to the skin utilizing a spray paint frosting technique. Number Of Freeze-Thaw Cycles: 1 freeze-thaw cycle Medical Necessity Information: It is in your best interest to select a reason for this procedure from the list below. All of these items fulfill various CMS LCD requirements except the new and changing color options. Detail Level: Simple Consent: The patient's consent was obtained including but not limited to risks of crusting, scabbing, blistering, scarring, darker or lighter pigmentary change, recurrence, incomplete removal and infection. Duration Of Freeze Thaw-Cycle (Seconds): 3 No